# Patient Record
Sex: MALE | Race: ASIAN | NOT HISPANIC OR LATINO | URBAN - METROPOLITAN AREA
[De-identification: names, ages, dates, MRNs, and addresses within clinical notes are randomized per-mention and may not be internally consistent; named-entity substitution may affect disease eponyms.]

---

## 2018-07-07 ENCOUNTER — EMERGENCY (EMERGENCY)
Facility: HOSPITAL | Age: 83
LOS: 1 days | Discharge: HOME | End: 2018-07-07
Attending: EMERGENCY MEDICINE
Payer: MEDICARE

## 2018-07-07 VITALS
OXYGEN SATURATION: 95 % | HEART RATE: 64 BPM | DIASTOLIC BLOOD PRESSURE: 74 MMHG | RESPIRATION RATE: 18 BRPM | TEMPERATURE: 98 F | SYSTOLIC BLOOD PRESSURE: 128 MMHG

## 2018-07-07 VITALS
OXYGEN SATURATION: 95 % | HEIGHT: 64 IN | RESPIRATION RATE: 18 BRPM | DIASTOLIC BLOOD PRESSURE: 74 MMHG | SYSTOLIC BLOOD PRESSURE: 121 MMHG | HEART RATE: 66 BPM | WEIGHT: 139.99 LBS | TEMPERATURE: 98 F

## 2018-07-07 LAB
ALBUMIN SERPL ELPH-MCNC: 4.3 G/DL — SIGNIFICANT CHANGE UP (ref 3.3–5)
ALP SERPL-CCNC: 95 U/L — SIGNIFICANT CHANGE UP (ref 40–120)
ALT FLD-CCNC: 23 U/L — SIGNIFICANT CHANGE UP (ref 10–45)
ANION GAP SERPL CALC-SCNC: 13 MMOL/L — SIGNIFICANT CHANGE UP (ref 5–17)
APPEARANCE UR: CLEAR — SIGNIFICANT CHANGE UP
APTT BLD: 35.6 SEC — SIGNIFICANT CHANGE UP (ref 27.5–37.4)
AST SERPL-CCNC: 22 U/L — SIGNIFICANT CHANGE UP (ref 10–40)
BASOPHILS # BLD AUTO: 0 K/UL — SIGNIFICANT CHANGE UP (ref 0–0.2)
BASOPHILS NFR BLD AUTO: 0.4 % — SIGNIFICANT CHANGE UP (ref 0–2)
BILIRUB SERPL-MCNC: 0.4 MG/DL — SIGNIFICANT CHANGE UP (ref 0.2–1.2)
BILIRUB UR-MCNC: NEGATIVE — SIGNIFICANT CHANGE UP
BUN SERPL-MCNC: 16 MG/DL — SIGNIFICANT CHANGE UP (ref 7–23)
CALCIUM SERPL-MCNC: 9.5 MG/DL — SIGNIFICANT CHANGE UP (ref 8.4–10.5)
CHLORIDE SERPL-SCNC: 102 MMOL/L — SIGNIFICANT CHANGE UP (ref 96–108)
CO2 SERPL-SCNC: 25 MMOL/L — SIGNIFICANT CHANGE UP (ref 22–31)
COLOR SPEC: YELLOW — SIGNIFICANT CHANGE UP
CREAT SERPL-MCNC: 0.84 MG/DL — SIGNIFICANT CHANGE UP (ref 0.5–1.3)
DIFF PNL FLD: ABNORMAL
EOSINOPHIL # BLD AUTO: 0.1 K/UL — SIGNIFICANT CHANGE UP (ref 0–0.5)
EOSINOPHIL NFR BLD AUTO: 1.6 % — SIGNIFICANT CHANGE UP (ref 0–6)
GLUCOSE SERPL-MCNC: 103 MG/DL — HIGH (ref 70–99)
GLUCOSE UR QL: NEGATIVE — SIGNIFICANT CHANGE UP
HCT VFR BLD CALC: 47.8 % — SIGNIFICANT CHANGE UP (ref 39–50)
HGB BLD-MCNC: 16.6 G/DL — SIGNIFICANT CHANGE UP (ref 13–17)
INR BLD: 1.03 RATIO — SIGNIFICANT CHANGE UP (ref 0.88–1.16)
KETONES UR-MCNC: NEGATIVE — SIGNIFICANT CHANGE UP
LEUKOCYTE ESTERASE UR-ACNC: ABNORMAL
LYMPHOCYTES # BLD AUTO: 0.9 K/UL — LOW (ref 1–3.3)
LYMPHOCYTES # BLD AUTO: 23.1 % — SIGNIFICANT CHANGE UP (ref 13–44)
MCHC RBC-ENTMCNC: 33.3 PG — SIGNIFICANT CHANGE UP (ref 27–34)
MCHC RBC-ENTMCNC: 34.8 GM/DL — SIGNIFICANT CHANGE UP (ref 32–36)
MCV RBC AUTO: 95.5 FL — SIGNIFICANT CHANGE UP (ref 80–100)
MONOCYTES # BLD AUTO: 0.3 K/UL — SIGNIFICANT CHANGE UP (ref 0–0.9)
MONOCYTES NFR BLD AUTO: 7.4 % — SIGNIFICANT CHANGE UP (ref 2–14)
NEUTROPHILS # BLD AUTO: 2.8 K/UL — SIGNIFICANT CHANGE UP (ref 1.8–7.4)
NEUTROPHILS NFR BLD AUTO: 67.5 % — SIGNIFICANT CHANGE UP (ref 43–77)
NITRITE UR-MCNC: NEGATIVE — SIGNIFICANT CHANGE UP
PH UR: 6.5 — SIGNIFICANT CHANGE UP (ref 5–8)
PLATELET # BLD AUTO: 134 K/UL — LOW (ref 150–400)
POTASSIUM SERPL-MCNC: 4 MMOL/L — SIGNIFICANT CHANGE UP (ref 3.5–5.3)
POTASSIUM SERPL-SCNC: 4 MMOL/L — SIGNIFICANT CHANGE UP (ref 3.5–5.3)
PROT SERPL-MCNC: 7.6 G/DL — SIGNIFICANT CHANGE UP (ref 6–8.3)
PROT UR-MCNC: SIGNIFICANT CHANGE UP
PROTHROM AB SERPL-ACNC: 11.1 SEC — SIGNIFICANT CHANGE UP (ref 9.8–12.7)
RBC # BLD: 5 M/UL — SIGNIFICANT CHANGE UP (ref 4.2–5.8)
RBC # FLD: 11.9 % — SIGNIFICANT CHANGE UP (ref 10.3–14.5)
RBC CASTS # UR COMP ASSIST: >50 /HPF (ref 0–2)
SODIUM SERPL-SCNC: 140 MMOL/L — SIGNIFICANT CHANGE UP (ref 135–145)
SP GR SPEC: 1.01 — SIGNIFICANT CHANGE UP (ref 1.01–1.02)
UROBILINOGEN FLD QL: NEGATIVE — SIGNIFICANT CHANGE UP
WBC # BLD: 4.1 K/UL — SIGNIFICANT CHANGE UP (ref 3.8–10.5)
WBC # FLD AUTO: 4.1 K/UL — SIGNIFICANT CHANGE UP (ref 3.8–10.5)
WBC UR QL: SIGNIFICANT CHANGE UP /HPF (ref 0–5)

## 2018-07-07 PROCEDURE — 85027 COMPLETE CBC AUTOMATED: CPT

## 2018-07-07 PROCEDURE — 81001 URINALYSIS AUTO W/SCOPE: CPT

## 2018-07-07 PROCEDURE — 80053 COMPREHEN METABOLIC PANEL: CPT

## 2018-07-07 PROCEDURE — 87086 URINE CULTURE/COLONY COUNT: CPT

## 2018-07-07 PROCEDURE — 85610 PROTHROMBIN TIME: CPT

## 2018-07-07 PROCEDURE — 99284 EMERGENCY DEPT VISIT MOD MDM: CPT

## 2018-07-07 PROCEDURE — 85730 THROMBOPLASTIN TIME PARTIAL: CPT

## 2018-07-07 PROCEDURE — 99283 EMERGENCY DEPT VISIT LOW MDM: CPT

## 2018-07-07 RX ORDER — CEPHALEXIN 500 MG
500 CAPSULE ORAL ONCE
Qty: 0 | Refills: 0 | Status: COMPLETED | OUTPATIENT
Start: 2018-07-07 | End: 2018-07-07

## 2018-07-07 RX ORDER — CEPHALEXIN 500 MG
1 CAPSULE ORAL
Qty: 14 | Refills: 0
Start: 2018-07-07 | End: 2018-07-13

## 2018-07-07 RX ORDER — SODIUM CHLORIDE 9 MG/ML
500 INJECTION INTRAMUSCULAR; INTRAVENOUS; SUBCUTANEOUS ONCE
Qty: 0 | Refills: 0 | Status: COMPLETED | OUTPATIENT
Start: 2018-07-07 | End: 2018-07-07

## 2018-07-07 RX ADMIN — Medication 500 MILLIGRAM(S): at 18:47

## 2018-07-07 RX ADMIN — SODIUM CHLORIDE 500 MILLILITER(S): 9 INJECTION INTRAMUSCULAR; INTRAVENOUS; SUBCUTANEOUS at 17:01

## 2018-07-07 NOTE — ED PROVIDER NOTE - PLAN OF CARE
1.  Stay well hydrated  2.  Take Keflex 500mgs every 12 hrs x 7 days  3.  Follow up with your PMD in 2-3 days.  Bring a copy of your results with you to your appointment       Follow up with Urology upon discharge [phone]  4.  Return to the ER for worsening bleeding, fevers/chills or any other concerning symptoms

## 2018-07-07 NOTE — ED PROVIDER NOTE - CARE PLAN
Principal Discharge DX:	Hematuria  Assessment and plan of treatment:	1.  Stay well hydrated  2.  Take Keflex 500mgs every 12 hrs x 7 days  3.  Follow up with your PMD in 2-3 days.  Bring a copy of your results with you to your appointment       Follow up with Urology upon discharge [phone]  4.  Return to the ER for worsening bleeding, fevers/chills or any other concerning symptoms Principal Discharge DX:	Cystitis  Assessment and plan of treatment:	1.  Stay well hydrated  2.  Take Keflex 500mgs every 12 hrs x 7 days  3.  Follow up with your PMD in 2-3 days.  Bring a copy of your results with you to your appointment       Follow up with Urology upon discharge [phone]  4.  Return to the ER for worsening bleeding, fevers/chills or any other concerning symptoms

## 2018-07-07 NOTE — ED ADULT NURSE NOTE - OBJECTIVE STATEMENT
83 y/o male c/o hematuria that started about 4 hours ago.  Pt denies trauma, dysuria, fever, chills, abd pain, back pain, n/v/d.  Pt denies history of UTI.  No acute distress noted.  Extremities mobile. 85 y/o male c/o hematuria that started about 2 pm.  Pt denies trauma, dysuria, fever, chills, abd pain, back pain, n/v/d.  Pt denies history of UTI.  No acute distress noted.  Extremities mobile. 83 y/o male c/o  hematuria with frequent urination since this afternoon.  Denies difficulty urination or urgency.     Denies weakness to extremities, trauma, fever, chills, chest pain, abd pain, n/v/d.   No acute distress noted.  Extremities mobile.

## 2018-07-07 NOTE — ED PROVIDER NOTE - OBJECTIVE STATEMENT
83yo male pt with PMHx of HTN, BPH, Thyroidism c/o hematuria 83yo male pt with PMHx of HTN, BPH, Thyroidism c/o hematuria with frequent urination since this afternoon. Denies fever, chills or recent sickness. Denies difficult urination or urgency. Denies CP/SOB/ABD pain or N/V/D. Denies sensory changes or weakness to extremities. Denies sensory changes or weakness to extremities. Denies taking AC.

## 2018-07-07 NOTE — ED PROVIDER NOTE - PROGRESS NOTE DETAILS
Attending Rodrigue GALEANO): 83y/o M with PMHx of HTN (on Avapro), hypothyroidism (on Synthroid), myasthenia gravis, BPH, presents to the ED c/o multiple episodes of hematuria beginning this morning. Pt last had hematuria 4y ago when he had a BPH. Denies fever, nausea/vomiting, blood in stool, burning urination, abdominal pain, back pain, recent weight loss, any other complaints. NKDA.     Exam: Abdomen soft, minimal suprapubic tenderness. Otherwise well appearing in NAD, non-toxic appearing. No CVA tenderness, neurologically intact.     Plan: Will place on abx, f/u with urology outpatient. Attending Note: 85y/o M with PMHx of HTN (on Avapro), hypothyroidism (on Synthroid), myasthenia gravis, BPH, presents to the ED c/o multiple episodes of hematuria beginning this morning. Pt last had hematuria 4y ago when he had a BPH. Denies fever, nausea/vomiting, blood in stool, burning urination, abdominal pain, back pain, recent weight loss, any other complaints. NKDA.     Exam: Abdomen soft, minimal suprapubic tenderness. Otherwise well appearing in NAD, non-toxic appearing. No CVA tenderness, neurologically intact.     Plan: Will place on abx, f/u with urology outpatient.

## 2018-07-08 LAB
CULTURE RESULTS: NO GROWTH — SIGNIFICANT CHANGE UP
SPECIMEN SOURCE: SIGNIFICANT CHANGE UP

## 2021-07-10 ENCOUNTER — EMERGENCY (EMERGENCY)
Facility: HOSPITAL | Age: 86
LOS: 1 days | End: 2021-07-10
Payer: MEDICARE

## 2021-07-10 VITALS
WEIGHT: 119.93 LBS | HEART RATE: 69 BPM | RESPIRATION RATE: 18 BRPM | SYSTOLIC BLOOD PRESSURE: 131 MMHG | TEMPERATURE: 98 F | HEIGHT: 64 IN | DIASTOLIC BLOOD PRESSURE: 79 MMHG | OXYGEN SATURATION: 95 %

## 2021-07-10 LAB
ALBUMIN SERPL ELPH-MCNC: 4.3 G/DL — SIGNIFICANT CHANGE UP (ref 3.3–5)
ALP SERPL-CCNC: 77 U/L — SIGNIFICANT CHANGE UP (ref 40–120)
ALT FLD-CCNC: 32 U/L — SIGNIFICANT CHANGE UP (ref 10–45)
ANION GAP SERPL CALC-SCNC: 11 MMOL/L — SIGNIFICANT CHANGE UP (ref 5–17)
AST SERPL-CCNC: 23 U/L — SIGNIFICANT CHANGE UP (ref 10–40)
BASOPHILS # BLD AUTO: 0.05 K/UL — SIGNIFICANT CHANGE UP (ref 0–0.2)
BASOPHILS NFR BLD AUTO: 0.8 % — SIGNIFICANT CHANGE UP (ref 0–2)
BILIRUB SERPL-MCNC: 0.5 MG/DL — SIGNIFICANT CHANGE UP (ref 0.2–1.2)
BUN SERPL-MCNC: 31 MG/DL — HIGH (ref 7–23)
CALCIUM SERPL-MCNC: 8.9 MG/DL — SIGNIFICANT CHANGE UP (ref 8.4–10.5)
CHLORIDE SERPL-SCNC: 110 MMOL/L — HIGH (ref 96–108)
CO2 SERPL-SCNC: 24 MMOL/L — SIGNIFICANT CHANGE UP (ref 22–31)
CREAT SERPL-MCNC: 0.67 MG/DL — SIGNIFICANT CHANGE UP (ref 0.5–1.3)
EOSINOPHIL # BLD AUTO: 0.05 K/UL — SIGNIFICANT CHANGE UP (ref 0–0.5)
EOSINOPHIL NFR BLD AUTO: 0.8 % — SIGNIFICANT CHANGE UP (ref 0–6)
GLUCOSE SERPL-MCNC: 104 MG/DL — HIGH (ref 70–99)
HCT VFR BLD CALC: 42.8 % — SIGNIFICANT CHANGE UP (ref 39–50)
HGB BLD-MCNC: 14.2 G/DL — SIGNIFICANT CHANGE UP (ref 13–17)
IMM GRANULOCYTES NFR BLD AUTO: 0.5 % — SIGNIFICANT CHANGE UP (ref 0–1.5)
LYMPHOCYTES # BLD AUTO: 0.55 K/UL — LOW (ref 1–3.3)
LYMPHOCYTES # BLD AUTO: 8.4 % — LOW (ref 13–44)
MCHC RBC-ENTMCNC: 31 PG — SIGNIFICANT CHANGE UP (ref 27–34)
MCHC RBC-ENTMCNC: 33.2 GM/DL — SIGNIFICANT CHANGE UP (ref 32–36)
MCV RBC AUTO: 93.4 FL — SIGNIFICANT CHANGE UP (ref 80–100)
MONOCYTES # BLD AUTO: 0.43 K/UL — SIGNIFICANT CHANGE UP (ref 0–0.9)
MONOCYTES NFR BLD AUTO: 6.6 % — SIGNIFICANT CHANGE UP (ref 2–14)
NEUTROPHILS # BLD AUTO: 5.4 K/UL — SIGNIFICANT CHANGE UP (ref 1.8–7.4)
NEUTROPHILS NFR BLD AUTO: 82.9 % — HIGH (ref 43–77)
NRBC # BLD: 0 /100 WBCS — SIGNIFICANT CHANGE UP (ref 0–0)
PLATELET # BLD AUTO: 146 K/UL — LOW (ref 150–400)
POTASSIUM SERPL-MCNC: 3.8 MMOL/L — SIGNIFICANT CHANGE UP (ref 3.5–5.3)
POTASSIUM SERPL-SCNC: 3.8 MMOL/L — SIGNIFICANT CHANGE UP (ref 3.5–5.3)
PROT SERPL-MCNC: 6.9 G/DL — SIGNIFICANT CHANGE UP (ref 6–8.3)
RBC # BLD: 4.58 M/UL — SIGNIFICANT CHANGE UP (ref 4.2–5.8)
RBC # FLD: 12.4 % — SIGNIFICANT CHANGE UP (ref 10.3–14.5)
SARS-COV-2 RNA SPEC QL NAA+PROBE: SIGNIFICANT CHANGE UP
SODIUM SERPL-SCNC: 145 MMOL/L — SIGNIFICANT CHANGE UP (ref 135–145)
WBC # BLD: 6.51 K/UL — SIGNIFICANT CHANGE UP (ref 3.8–10.5)
WBC # FLD AUTO: 6.51 K/UL — SIGNIFICANT CHANGE UP (ref 3.8–10.5)

## 2021-07-10 PROCEDURE — 80053 COMPREHEN METABOLIC PANEL: CPT

## 2021-07-10 PROCEDURE — 71250 CT THORAX DX C-: CPT | Mod: 26,MG

## 2021-07-10 PROCEDURE — 76377 3D RENDER W/INTRP POSTPROCES: CPT | Mod: 26

## 2021-07-10 PROCEDURE — 70486 CT MAXILLOFACIAL W/O DYE: CPT | Mod: 26,MA

## 2021-07-10 PROCEDURE — 72125 CT NECK SPINE W/O DYE: CPT | Mod: 26,MA

## 2021-07-10 PROCEDURE — 93010 ELECTROCARDIOGRAM REPORT: CPT

## 2021-07-10 PROCEDURE — 70450 CT HEAD/BRAIN W/O DYE: CPT | Mod: 26,MA

## 2021-07-10 PROCEDURE — 73030 X-RAY EXAM OF SHOULDER: CPT | Mod: 26,LT

## 2021-07-10 PROCEDURE — 70450 CT HEAD/BRAIN W/O DYE: CPT

## 2021-07-10 PROCEDURE — G1004: CPT

## 2021-07-10 PROCEDURE — 87635 SARS-COV-2 COVID-19 AMP PRB: CPT

## 2021-07-10 PROCEDURE — 90714 TD VACC NO PRESV 7 YRS+ IM: CPT

## 2021-07-10 PROCEDURE — 71250 CT THORAX DX C-: CPT

## 2021-07-10 PROCEDURE — 99284 EMERGENCY DEPT VISIT MOD MDM: CPT | Mod: 25

## 2021-07-10 PROCEDURE — 93005 ELECTROCARDIOGRAM TRACING: CPT

## 2021-07-10 PROCEDURE — 85025 COMPLETE CBC W/AUTO DIFF WBC: CPT

## 2021-07-10 PROCEDURE — 76377 3D RENDER W/INTRP POSTPROCES: CPT

## 2021-07-10 PROCEDURE — 90471 IMMUNIZATION ADMIN: CPT

## 2021-07-10 PROCEDURE — 73060 X-RAY EXAM OF HUMERUS: CPT | Mod: 26,LT

## 2021-07-10 PROCEDURE — 70486 CT MAXILLOFACIAL W/O DYE: CPT

## 2021-07-10 PROCEDURE — 73030 X-RAY EXAM OF SHOULDER: CPT

## 2021-07-10 PROCEDURE — 99285 EMERGENCY DEPT VISIT HI MDM: CPT

## 2021-07-10 PROCEDURE — 73060 X-RAY EXAM OF HUMERUS: CPT

## 2021-07-10 PROCEDURE — 72125 CT NECK SPINE W/O DYE: CPT

## 2021-07-10 RX ORDER — TETANUS AND DIPHTHERIA TOXOIDS ADSORBED 2; 2 [LF]/.5ML; [LF]/.5ML
0.5 INJECTION INTRAMUSCULAR ONCE
Refills: 0 | Status: COMPLETED | OUTPATIENT
Start: 2021-07-10 | End: 2021-07-10

## 2021-07-10 RX ORDER — BACITRACIN ZINC 500 UNIT/G
1 OINTMENT IN PACKET (EA) TOPICAL ONCE
Refills: 0 | Status: COMPLETED | OUTPATIENT
Start: 2021-07-10 | End: 2021-07-10

## 2021-07-10 RX ADMIN — TETANUS AND DIPHTHERIA TOXOIDS ADSORBED 0.5 MILLILITER(S): 2; 2 INJECTION INTRAMUSCULAR at 09:19

## 2021-07-10 RX ADMIN — Medication 1 APPLICATION(S): at 11:27

## 2021-07-10 NOTE — ED PROVIDER NOTE - NS ED ROS FT
REVIEW OF SYSTEMS:  CONSTITUTIONAL: No weakness, fevers, chills, sick contacts, or unintended weight loss  EYES: No visual changes or vertigo  ENT: No throat pain, rhinorrhea, or hearing loss   NECK: No pain or stiffness  RESPIRATORY: No cough, wheezing, hemoptysis; No shortness of breath  CARDIOVASCULAR: No chest pain or palpitations  GASTROINTESTINAL: No abdominal or epigastric pain. No nausea, vomiting, or hematemesis; No diarrhea or constipation. No melena or hematochezia.  GENITOURINARY: No dysuria, frequency or hematuria  NEUROLOGICAL: No numbness or weakness. Left arm pain.   SKIN: + facial bruising. + b/l knee abrasions  Psych: Good mood, no substance use

## 2021-07-10 NOTE — ED PROVIDER NOTE - NSFOLLOWUPINSTRUCTIONS_ED_ALL_ED_FT
You were seen in the Emergency Department for a fall. Your left shoulder hurts but there were no signs of fractures or dislocations. You had a scan of the head which just showed some swelling in the left eye area but no bleeding. Take tylenol and ibuprofen, keep your arm in a sling, and work with PT.     1) Advance activity as tolerated.   2) Continue all previously prescribed medications as directed.    3) Follow up with your primary care physician in 24-48 hours - take copies of your results.    4) Return to the Emergency Department for worsening or persistent symptoms, and/or ANY NEW OR CONCERNING SYMPTOMS.

## 2021-07-10 NOTE — ED ADULT TRIAGE NOTE - CHIEF COMPLAINT QUOTE
left shoulder pain, bilateral knee pain, facial injury s/p trip and fall on concrete yesterday.  denies LOC or a.c. use

## 2021-07-10 NOTE — ED PROVIDER NOTE - CLINICAL SUMMARY MEDICAL DECISION MAKING FREE TEXT BOX
Impression:  Elderly M, s/p mechanical fall.  Physical exam concerning for L shoulder Fx v dislocation.  Plan:  labs, UA, CXR, ECG, CT head/cspine (cannot r/o injury by CCTHR/CCTCspR), xray L shoulder, declining pain meds. Impression:  Elderly M, s/p mechanical fall.  Physical exam concerning for L shoulder Fx v dislocation.  Plan:  labs, UA, CXR, ECG, CT head/cspine (cannot r/o injury by CCTHR/CCTCspR), xray L shoulder, declining pain meds.  Conversion with daughter: Marilyn Chacko [phone] states that if no surgical intervention indicated, patient has a lot of family support at home (would live with daughter or other daughter will live with him) and has PT coming to see him already and would be comfortable being discharged to home.

## 2021-07-10 NOTE — ED ADULT NURSE NOTE - OBJECTIVE STATEMENT
complaining of left shoulder pain, bilateral knee pain, facial injury x last night at 8pm. Pt states, "My wife tripped and fell and I tried to catch her and then I tripped and fell. I fell on my left face and shoulder." Pt denies LOC and denies being on blood thinners. Pt is Danish speaking but understands and speaks english. Pt endorses left sided face and shoulder pain at present. Pt denies headache, blurred vision, lightheadedness, dizziness, SOB, chest pain, abdominal pain, N/V/D, urinary symptoms, numbness and tingling at present.
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
## 2021-07-10 NOTE — ED ADULT NURSE NOTE - NSIMPLEMENTINTERV_GEN_ALL_ED
Implemented All Fall with Harm Risk Interventions:  [place] to call system. Call bell, personal items and telephone within reach. Instruct patient to call for assistance. Room bathroom lighting operational. Non-slip footwear when patient is off stretcher. Physically safe environment: no spills, clutter or unnecessary equipment. Stretcher in lowest position, wheels locked, appropriate side rails in place. Provide visual cue, wrist band, yellow gown, etc. Monitor gait and stability. Monitor for mental status changes and reorient to person, place, and time. Review medications for side effects contributing to fall risk. Reinforce activity limits and safety measures with patient and family. Provide visual clues: red socks.

## 2021-07-10 NOTE — ED PROVIDER NOTE - PHYSICAL EXAMINATION
Objective:    Vitals: Vital Signs Last 24 Hrs  T(C): 36.5 (07-10-21 @ 07:13), Max: 36.5 (07-10-21 @ 07:13)  T(F): 97.7 (07-10-21 @ 07:13), Max: 97.7 (07-10-21 @ 07:13)  HR: 69 (07-10-21 @ 07:13) (69 - 69)  BP: 131/79 (07-10-21 @ 07:13) (131/79 - 131/79)  BP(mean): --  RR: 18 (07-10-21 @ 07:13) (18 - 18)  SpO2: 95% (07-10-21 @ 07:13) (95% - 95%)            I&O's Summary      PHYSICAL EXAM:  GENERAL: NAD, well-groomed, well-developed, in makeshift arm sling  HEAD: Left periorbital bruising.   EYES: EOMI, PERRLA, conjunctiva and sclera clear  ENMT: No tonsillar erythema, exudates, or enlargement; Moist mucous membranes, Good dentition, No lesions  CHEST/LUNG: Clear to auscultation bilaterally; No rales, rhonchi, wheezing, or rubs  HEART: Regular rate and rhythm; No murmurs, rubs, or gallops  ABDOMEN: Soft, Nontender, Nondistended; Bowel sounds present  EXTREMITIES:  2+ Peripheral Pulses. b/l knee mild 3cm abrasions.   NERVOUS SYSTEM:  Alert & Oriented X4, Good concentration  PSYCH: Normal Affect. Speaking in Full Sentences. Laying in bed comfortably; not agitated
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
## 2021-07-10 NOTE — ED PROVIDER NOTE - CARE PROVIDER_API CALL
Fco Lagos [facility]  [address] [place] [address]  Phone: [phone]  Fax: [phone]  Follow Up Time:

## 2021-07-10 NOTE — ED ADULT NURSE NOTE - DATE OF LAST VACCINATION
Ongoing SW/CM Assessment/Plan of Care Note     See SW/CM flowsheets for goals and other objective data.    Patient/Family discharge goal (s):  Goal #1: Communication facilitated          PT Recommendation:  Recommendation for Discharge: PT WI: Post acute therapy       OT Recommendation:  Recommendations for Discharge: OT WI: Post acute therapy       SLP Recommendation:       Disposition:       Progress note:    met with patient in regards to discharge planning. Patient aware at this time physiatry will follow however likely if patient does not make further progress intensive rehab program will not be recommended and [facility] would be more appropriate. Patient reports he is hopeful for pain relief with a discharge to home when stable however open to [facility] referrals. Referrals sent to [facility], [facility], [facility] and [facility]. Patient reports hopeful therapy team at [facility] will have knowledge on sciatic pain.  explained [facility] will review clinical and make determination if they can accommodate patient needs. Support offered. Social work peer to follow in am for updates pending referrals.          24-Feb-2021

## 2021-07-10 NOTE — ED PROVIDER NOTE - CARE PLAN
Principal Discharge DX:	Fall   Principal Discharge DX:	Contusion of left shoulder, initial encounter  Secondary Diagnosis:	Facial contusion, initial encounter  Secondary Diagnosis:	Fall, initial encounter

## 2021-07-19 PROBLEM — N40.0 BENIGN PROSTATIC HYPERPLASIA WITHOUT LOWER URINARY TRACT SYMPTOMS: Chronic | Status: ACTIVE | Noted: 2018-07-07

## 2021-07-19 PROBLEM — E03.9 HYPOTHYROIDISM, UNSPECIFIED: Chronic | Status: ACTIVE | Noted: 2018-07-07

## 2021-09-20 ENCOUNTER — APPOINTMENT (OUTPATIENT)
Dept: GERIATRICS | Facility: CLINIC | Age: 86
End: 2021-09-20
Payer: MEDICARE

## 2021-09-20 ENCOUNTER — NON-APPOINTMENT (OUTPATIENT)
Age: 86
End: 2021-09-20

## 2021-09-20 VITALS
WEIGHT: 120.13 LBS | BODY MASS INDEX: 20.51 KG/M2 | SYSTOLIC BLOOD PRESSURE: 100 MMHG | DIASTOLIC BLOOD PRESSURE: 54 MMHG | RESPIRATION RATE: 16 BRPM | OXYGEN SATURATION: 97 % | HEIGHT: 64 IN | TEMPERATURE: 97.8 F | HEART RATE: 62 BPM

## 2021-09-20 DIAGNOSIS — M79.605 PAIN IN RIGHT LEG: ICD-10-CM

## 2021-09-20 DIAGNOSIS — R60.9 EDEMA, UNSPECIFIED: ICD-10-CM

## 2021-09-20 DIAGNOSIS — M79.604 PAIN IN RIGHT LEG: ICD-10-CM

## 2021-09-20 PROCEDURE — 99215 OFFICE O/P EST HI 40 MIN: CPT

## 2021-09-29 PROBLEM — R60.9 EDEMA: Status: ACTIVE | Noted: 2021-09-20

## 2021-09-29 PROBLEM — M79.604 PAIN IN BOTH LOWER EXTREMITIES: Status: ACTIVE | Noted: 2021-09-29

## 2021-10-25 ENCOUNTER — TRANSCRIPTION ENCOUNTER (OUTPATIENT)
Age: 86
End: 2021-10-25

## 2021-10-26 ENCOUNTER — OUTPATIENT (OUTPATIENT)
Dept: OUTPATIENT SERVICES | Facility: HOSPITAL | Age: 86
LOS: 1 days | End: 2021-10-26
Payer: MEDICARE

## 2021-10-26 ENCOUNTER — APPOINTMENT (OUTPATIENT)
Dept: ULTRASOUND IMAGING | Facility: CLINIC | Age: 86
End: 2021-10-26
Payer: MEDICARE

## 2021-10-26 ENCOUNTER — APPOINTMENT (OUTPATIENT)
Dept: RADIOLOGY | Facility: CLINIC | Age: 86
End: 2021-10-26
Payer: MEDICARE

## 2021-10-26 DIAGNOSIS — R60.9 EDEMA, UNSPECIFIED: ICD-10-CM

## 2021-10-26 PROCEDURE — 73030 X-RAY EXAM OF SHOULDER: CPT | Mod: 26,LT

## 2021-10-26 PROCEDURE — 93925 LOWER EXTREMITY STUDY: CPT | Mod: 26

## 2021-10-26 PROCEDURE — 93925 LOWER EXTREMITY STUDY: CPT

## 2021-10-26 PROCEDURE — 73030 X-RAY EXAM OF SHOULDER: CPT

## 2021-11-01 ENCOUNTER — APPOINTMENT (OUTPATIENT)
Dept: SURGERY | Facility: CLINIC | Age: 86
End: 2021-11-01
Payer: MEDICARE

## 2021-11-01 VITALS — DIASTOLIC BLOOD PRESSURE: 75 MMHG | TEMPERATURE: 98.25 F | SYSTOLIC BLOOD PRESSURE: 119 MMHG | HEART RATE: 65 BPM

## 2021-11-01 PROCEDURE — 99203 OFFICE O/P NEW LOW 30 MIN: CPT

## 2021-11-23 ENCOUNTER — TRANSCRIPTION ENCOUNTER (OUTPATIENT)
Age: 86
End: 2021-11-23

## 2021-11-29 ENCOUNTER — NON-APPOINTMENT (OUTPATIENT)
Age: 86
End: 2021-11-29

## 2021-11-29 ENCOUNTER — APPOINTMENT (OUTPATIENT)
Dept: GERIATRICS | Facility: CLINIC | Age: 86
End: 2021-11-29
Payer: MEDICARE

## 2021-11-29 VITALS
DIASTOLIC BLOOD PRESSURE: 62 MMHG | HEIGHT: 64 IN | WEIGHT: 121.25 LBS | SYSTOLIC BLOOD PRESSURE: 100 MMHG | HEART RATE: 70 BPM | RESPIRATION RATE: 16 BRPM | BODY MASS INDEX: 20.7 KG/M2 | OXYGEN SATURATION: 95 % | TEMPERATURE: 97.4 F

## 2021-11-29 DIAGNOSIS — M25.512 PAIN IN LEFT SHOULDER: ICD-10-CM

## 2021-11-29 DIAGNOSIS — Z01.818 ENCOUNTER FOR OTHER PREPROCEDURAL EXAMINATION: ICD-10-CM

## 2021-11-29 DIAGNOSIS — E55.9 VITAMIN D DEFICIENCY, UNSPECIFIED: ICD-10-CM

## 2021-11-29 DIAGNOSIS — K40.90 UNILATERAL INGUINAL HERNIA, W/OUT OBSTRUCTION OR GANGRENE, NOT SPECIFIED AS RECURRENT: ICD-10-CM

## 2021-11-29 DIAGNOSIS — R73.09 OTHER ABNORMAL GLUCOSE: ICD-10-CM

## 2021-11-29 PROCEDURE — 99214 OFFICE O/P EST MOD 30 MIN: CPT | Mod: 25

## 2021-11-29 PROCEDURE — 93000 ELECTROCARDIOGRAM COMPLETE: CPT

## 2021-11-29 RX ORDER — CARBIDOPA AND LEVODOPA 25; 100 MG/1; MG/1
25-100 TABLET ORAL
Qty: 4 | Refills: 0 | Status: DISCONTINUED | COMMUNITY
Start: 2021-09-30

## 2021-11-30 LAB
25(OH)D3 SERPL-MCNC: 52.1 NG/ML
ALBUMIN SERPL ELPH-MCNC: 4.3 G/DL
ALP BLD-CCNC: 88 U/L
ALT SERPL-CCNC: 7 U/L
ANION GAP SERPL CALC-SCNC: 10 MMOL/L
APTT BLD: 35.7 SEC
AST SERPL-CCNC: 21 U/L
BASOPHILS # BLD AUTO: 0.04 K/UL
BASOPHILS NFR BLD AUTO: 0.9 %
BILIRUB SERPL-MCNC: 0.5 MG/DL
BUN SERPL-MCNC: 25 MG/DL
CALCIUM SERPL-MCNC: 8.9 MG/DL
CHLORIDE SERPL-SCNC: 107 MMOL/L
CO2 SERPL-SCNC: 23 MMOL/L
CREAT SERPL-MCNC: 0.68 MG/DL
EOSINOPHIL # BLD AUTO: 0.01 K/UL
EOSINOPHIL NFR BLD AUTO: 0.2 %
ESTIMATED AVERAGE GLUCOSE: 108 MG/DL
FERRITIN SERPL-MCNC: 348 NG/ML
GLUCOSE SERPL-MCNC: 91 MG/DL
HBA1C MFR BLD HPLC: 5.4 %
HCT VFR BLD CALC: 43.1 %
HGB BLD-MCNC: 14.2 G/DL
IMM GRANULOCYTES NFR BLD AUTO: 0.2 %
INR PPP: 1.02 RATIO
IRON SATN MFR SERPL: 41 %
IRON SERPL-MCNC: 106 UG/DL
LYMPHOCYTES # BLD AUTO: 0.55 K/UL
LYMPHOCYTES NFR BLD AUTO: 12.9 %
MAN DIFF?: NORMAL
MCHC RBC-ENTMCNC: 30.9 PG
MCHC RBC-ENTMCNC: 32.9 GM/DL
MCV RBC AUTO: 93.7 FL
MONOCYTES # BLD AUTO: 0.33 K/UL
MONOCYTES NFR BLD AUTO: 7.7 %
NEUTROPHILS # BLD AUTO: 3.32 K/UL
NEUTROPHILS NFR BLD AUTO: 78.1 %
PLATELET # BLD AUTO: 160 K/UL
POTASSIUM SERPL-SCNC: 4.2 MMOL/L
PROT SERPL-MCNC: 6.6 G/DL
PT BLD: 12.1 SEC
RBC # BLD: 4.6 M/UL
RBC # FLD: 12.6 %
SODIUM SERPL-SCNC: 140 MMOL/L
TIBC SERPL-MCNC: 261 UG/DL
TSH SERPL-ACNC: 1.69 UIU/ML
UIBC SERPL-MCNC: 156 UG/DL
WBC # FLD AUTO: 4.26 K/UL

## 2021-12-02 NOTE — ASSESSMENT
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 patient is medically optimized to proceed with planned procedure.  EKG today with NSR at rate of 63 without t wave abnormalities.\par labwork:  reviewed and stable

## 2021-12-02 NOTE — HISTORY OF PRESENT ILLNESS
[Preoperative Visit] : for a medical evaluation prior to surgery [Scheduled Procedure ___] : a [unfilled] [Date of Surgery ___] : on [unfilled] [Prior Anesthesia] : Prior anesthesia [Poor] : Poor [Fever] : no fever [Chills] : no chills [Fatigue] : no fatigue [Chest Pain] : no chest pain [Cough] : no cough [Dyspnea] : no dyspnea [Dysuria] : no dysuria [Urinary Frequency] : no urinary frequency [Nausea] : no nausea [Vomiting] : no vomiting [Diarrhea] : no diarrhea [Abdominal Pain] : no abdominal pain [Easy Bruising] : no easy bruising [Lower Extremity Swelling] : no lower extremity swelling [Diabetes] : no diabetes [Cardiovascular Disease] : no cardiovascular disease [Pulmonary Disease] : no pulmonary disease [Anti-Platelet Agents] : no anti-platelet agents [Nicotine Dependence] : no nicotine dependence [Alcohol Use] : no  alcohol use [Renal Disease] : no renal disease [GI Disease] : no gastrointestinal disease [Sleep Apnea] : no sleep apnea [Thromboembolic Problems] : no thromboembolic problems [Frequent use of NSAIDs] : no use of NSAIDs [Prev Anesthesia Reaction] : no previous anesthesia reaction [Anesthesia Reaction] : no anesthesia reaction [Sudden Death] : no sudden death [Clotting Disorder] : no clotting disorder [Bleeding Disorder] : no bleeding disorder [de-identified] : Dr. 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  \par \par follows with endo : Fco Houser MD as he pcp\par \par takes levo 50mcg\par has had recent bloodwork that was normal per patient\par cholesterol was stable\par \par bph: \par finasteride 5mg \par Dr. Segundo Bruno urologist\par hx of prostate ablation\par \par lives with wife \par he has help with cooking \par \par functional status:\par worse gait over last 1 year, worse over last 6 months\par home OT\par walks with cane\par May 2021: fall required ER visit for facial bruising, no fxr but with worse left arm ROM since the fall\par \par independent with ADL's\par needs help with IADL's\par \par neurology evaluation:\par Dr. George Collins at [facility] has had memory testing dtr reports moderate dementia\par atypical Parkinsons syndrome\par started sinemet 25/100 1/2 tab twice day in May 2021, and was changed to ER at same dose.\par dtr notes improved hypophonia\par overall unchanged gait/poor balance\par weight loss\par constipation: colace daily, recommend starting senna\par \par sleep disturbance:\par ropinirole and deep breaths\par sleep study in May 2021: negative for sleep apnea\par ? REM sleep disorder possible per dtr\par \par hx of prediabetes that is diet controlled\par \par notes neuropathy: numbness of b/l lets? related to RLS\par sometimes with low back pain when standing for prolonged time but it is minimal\par tried gabapentin for only for a few days.\par  \par he denies CV or kidney disease.\par \par left shoulder pain: worse from the fall , has been doing OT, but still with some pain, discussed referra to ortho\par \par ACP:\par they are planning to see  soon\par \par 1 children\par son: Segundo Chacko\par dtr: Citlali Chaney (md pulm crit)\par dtr: Marilyn Chacko \par \par

## 2021-12-02 NOTE — PHYSICAL EXAM
[Normal] : no respiratory distress, no accessory muscle use, normal respiratory rhythm and effort, lungs were clear to auscultation bilaterally [Normal S1, S2] : normal S1 and S2 [Heart Rate And Rhythm] : heart rate was normal and rhythm regular [Bowel Sounds] : normal bowel sounds [Abdomen Tenderness] : non-tender [Abdomen Soft] : soft [Normal Color / Pigmentation] : normal skin color and pigmentation [No Focal Deficits] : no focal deficits [Normal Affect] : the affect was normal [Normal Mood] : the mood was normal [de-identified] : mild edema [de-identified] : left inguinal hernia [de-identified] : slow gait [de-identified] : hypophonia

## 2021-12-03 ENCOUNTER — OUTPATIENT (OUTPATIENT)
Dept: OUTPATIENT SERVICES | Facility: HOSPITAL | Age: 86
LOS: 1 days | Discharge: HOME | End: 2021-12-03
Payer: MEDICARE

## 2021-12-03 VITALS
WEIGHT: 121.25 LBS | SYSTOLIC BLOOD PRESSURE: 106 MMHG | HEIGHT: 65 IN | OXYGEN SATURATION: 98 % | RESPIRATION RATE: 16 BRPM | TEMPERATURE: 98 F | DIASTOLIC BLOOD PRESSURE: 61 MMHG | HEART RATE: 60 BPM

## 2021-12-03 DIAGNOSIS — Z01.818 ENCOUNTER FOR OTHER PREPROCEDURAL EXAMINATION: ICD-10-CM

## 2021-12-03 DIAGNOSIS — N40.0 BENIGN PROSTATIC HYPERPLASIA WITHOUT LOWER URINARY TRACT SYMPTOMS: ICD-10-CM

## 2021-12-03 DIAGNOSIS — E03.9 HYPOTHYROIDISM, UNSPECIFIED: ICD-10-CM

## 2021-12-03 DIAGNOSIS — Z01.812 ENCOUNTER FOR PREPROCEDURAL LABORATORY EXAMINATION: ICD-10-CM

## 2021-12-03 DIAGNOSIS — Z90.79 ACQUIRED ABSENCE OF OTHER GENITAL ORGAN(S): Chronic | ICD-10-CM

## 2021-12-03 DIAGNOSIS — F03.90 UNSPECIFIED DEMENTIA, UNSPECIFIED SEVERITY, WITHOUT BEHAVIORAL DISTURBANCE, PSYCHOTIC DISTURBANCE, MOOD DISTURBANCE, AND ANXIETY: ICD-10-CM

## 2021-12-03 DIAGNOSIS — K40.90 UNILATERAL INGUINAL HERNIA, WITHOUT OBSTRUCTION OR GANGRENE, NOT SPECIFIED AS RECURRENT: ICD-10-CM

## 2021-12-03 DIAGNOSIS — G25.81 RESTLESS LEGS SYNDROME: ICD-10-CM

## 2021-12-03 DIAGNOSIS — G20 PARKINSON'S DISEASE: ICD-10-CM

## 2021-12-03 LAB
ANION GAP SERPL CALC-SCNC: 8 MMOL/L — SIGNIFICANT CHANGE UP (ref 5–17)
BUN SERPL-MCNC: 28 MG/DL — HIGH (ref 7–23)
CALCIUM SERPL-MCNC: 8.6 MG/DL — SIGNIFICANT CHANGE UP (ref 8.5–10.1)
CHLORIDE SERPL-SCNC: 107 MMOL/L — SIGNIFICANT CHANGE UP (ref 96–108)
CO2 SERPL-SCNC: 25 MMOL/L — SIGNIFICANT CHANGE UP (ref 22–31)
CREAT SERPL-MCNC: 0.58 MG/DL — SIGNIFICANT CHANGE UP (ref 0.5–1.3)
GLUCOSE SERPL-MCNC: 116 MG/DL — HIGH (ref 70–99)
HCT VFR BLD CALC: 42 % — SIGNIFICANT CHANGE UP (ref 39–50)
HGB BLD-MCNC: 14 G/DL — SIGNIFICANT CHANGE UP (ref 13–17)
MCHC RBC-ENTMCNC: 30.8 PG — SIGNIFICANT CHANGE UP (ref 27–34)
MCHC RBC-ENTMCNC: 33.3 GM/DL — SIGNIFICANT CHANGE UP (ref 32–36)
MCV RBC AUTO: 92.3 FL — SIGNIFICANT CHANGE UP (ref 80–100)
NRBC # BLD: 0 /100 WBCS — SIGNIFICANT CHANGE UP (ref 0–0)
PLATELET # BLD AUTO: 156 K/UL — SIGNIFICANT CHANGE UP (ref 150–400)
POTASSIUM SERPL-MCNC: 3.6 MMOL/L — SIGNIFICANT CHANGE UP (ref 3.5–5.3)
POTASSIUM SERPL-SCNC: 3.6 MMOL/L — SIGNIFICANT CHANGE UP (ref 3.5–5.3)
RBC # BLD: 4.55 M/UL — SIGNIFICANT CHANGE UP (ref 4.2–5.8)
RBC # FLD: 12.6 % — SIGNIFICANT CHANGE UP (ref 10.3–14.5)
SODIUM SERPL-SCNC: 140 MMOL/L — SIGNIFICANT CHANGE UP (ref 135–145)
WBC # BLD: 4.59 K/UL — SIGNIFICANT CHANGE UP (ref 3.8–10.5)
WBC # FLD AUTO: 4.59 K/UL — SIGNIFICANT CHANGE UP (ref 3.8–10.5)

## 2021-12-03 PROCEDURE — 93010 ELECTROCARDIOGRAM REPORT: CPT

## 2021-12-03 RX ORDER — LEVOTHYROXINE SODIUM 125 MCG
0 TABLET ORAL
Qty: 0 | Refills: 0 | DISCHARGE

## 2021-12-03 NOTE — H&P PST ADULT - NSICDXPASTMEDICALHX_GEN_ALL_CORE_FT
PAST MEDICAL HISTORY:  BPH (benign prostatic hyperplasia)     Dementia     HTN (hypertension) Off meds x many years    Hypothyroidism

## 2021-12-03 NOTE — H&P PST ADULT - PROBLEM SELECTOR PLAN 1
left inguinal hernia repair open  Pre-op instructions given by RN, patient verbalized understanding  Chlorhexidine wash instructions given  Medical clearance

## 2021-12-03 NOTE — H&P PST ADULT - HISTORY OF PRESENT ILLNESS
87M pm .... c/o .... 2/2 ... here for PST for scheduled ..... 87M pmh hypothyroid, bph, RLS, Parkinson's disease c/o left groin pain 2/2 unilateral inguinal hernia here for PST for scheduled Left inguinal hernia repair open  This patient denies any fever, cough, sob, flu like symptoms or travel outside of the [place] in the past 30 days  COVID vaccination series including booster completed

## 2021-12-03 NOTE — H&P PST ADULT - NSALCOHOLFREQ_GEN_A_CORE_SD
location identified, draped/prepped, sterile technique used/blood seen on insertion/dressing applied/flushes easily/secured in place
monthly or less

## 2021-12-03 NOTE — H&P PST ADULT - ASSESSMENT
87M pmh hypothyroid, bph, RLS, Parkinson's disease c/o left groin pain 2/2 unilateral inguinal hernia here for PST for scheduled Left inguinal hernia repair open  CAPRINI SCORE    AGE RELATED RISK FACTORS                                                       MOBILITY RELATED FACTORS  [ ] Age 41-60 years                                            (1 Point)                  [ ] Bed rest                                                        (1 Point)  [ ] Age: 61-74 years                                           (2 Points)                [ ] Plaster cast                                                   (2 Points)  [x ] Age= 75 years                                              (3 Points)                 [ ] Bed bound for more than 72 hours                   (2 Points)    DISEASE RELATED RISK FACTORS                                               GENDER SPECIFIC FACTORS  [x ] Edema in the lower extremities                       (1 Point)                  [ ] Pregnancy                                                     (1 Point)  [ ] Varicose veins                                               (1 Point)                  [ ] Post-partum < 6 weeks                                   (1 Point)             [x ] BMI > 25 Kg/m2                                            (1 Point)                  [ ] Hormonal therapy  or oral contraception            (1 Point)                 [ ] Sepsis (in the previous month)                        (1 Point)                  [ ] History of pregnancy complications  [ ] Pneumonia or serious lung disease                                               [ ] Unexplained or recurrent                       (1 Point)           (in the previous month)                               (1 Point)  [ ] Abnormal pulmonary function test                     (1 Point)                 SURGERY RELATED RISK FACTORS  [ ] Acute myocardial infarction                              (1 Point)                 [ ]  Section                                            (1 Point)  [ ] Congestive heart failure (in the previous month)  (1 Point)                 [ ] Minor surgery                                                 (1 Point)   [ ] Inflammatory bowel disease                             (1 Point)                 [ ] Arthroscopic surgery                                        (2 Points)  [ ] Central venous access                                    (2 Points)                [x ] General surgery lasting more than 45 minutes   (2 Points)       [ ] Stroke (in the previous month)                          (5 Points)               [ ] Elective arthroplasty                                        (5 Points)                                                                                                                                               HEMATOLOGY RELATED FACTORS                                                 TRAUMA RELATED RISK FACTORS  [ ] Prior episodes of VTE                                     (3 Points)                 [ ] Fracture of the hip, pelvis, or leg                       (5 Points)  [ ] Positive family history for VTE                         (3 Points)                 [ ] Acute spinal cord injury (in the previous month)  (5 Points)  [ ] Prothrombin 91602 A                                      (3 Points)                 [ ] Paralysis  (less than 1 month)                          (5 Points)  [ ] Factor V Leiden                                             (3 Points)                 [ ] Multiple Trauma within 1 month                         (5 Points)  [ ] Lupus anticoagulants                                     (3 Points)                                                           [ ] Anticardiolipin antibodies                                (3 Points)                                                       [ ] High homocysteine in the blood                      (3 Points)                                             [ ] Other congenital or acquired thrombophilia       (3 Points)                                                [ ] Heparin induced thrombocytopenia                  (3 Points)                                          Total Score [     7     ]

## 2021-12-03 NOTE — H&P PST ADULT - NSANTHOSAYNRD_GEN_A_CORE
No. MUSA screening performed.  STOP BANG Legend: 0-2 = LOW Risk; 3-4 = INTERMEDIATE Risk; 5-8 = HIGH Risk

## 2021-12-04 ENCOUNTER — APPOINTMENT (OUTPATIENT)
Dept: DISASTER EMERGENCY | Facility: CLINIC | Age: 86
End: 2021-12-04

## 2021-12-04 LAB — SARS-COV-2 N GENE NPH QL NAA+PROBE: NOT DETECTED

## 2021-12-06 ENCOUNTER — TRANSCRIPTION ENCOUNTER (OUTPATIENT)
Age: 86
End: 2021-12-06

## 2021-12-07 ENCOUNTER — OUTPATIENT (OUTPATIENT)
Dept: OUTPATIENT SERVICES | Facility: HOSPITAL | Age: 86
LOS: 1 days | Discharge: HOME | End: 2021-12-07
Payer: MEDICARE

## 2021-12-07 ENCOUNTER — APPOINTMENT (OUTPATIENT)
Dept: SURGERY | Facility: HOSPITAL | Age: 86
End: 2021-12-07

## 2021-12-07 VITALS
SYSTOLIC BLOOD PRESSURE: 121 MMHG | HEIGHT: 65 IN | HEART RATE: 54 BPM | RESPIRATION RATE: 14 BRPM | DIASTOLIC BLOOD PRESSURE: 72 MMHG | WEIGHT: 266.76 LBS

## 2021-12-07 VITALS
RESPIRATION RATE: 20 BRPM | DIASTOLIC BLOOD PRESSURE: 88 MMHG | OXYGEN SATURATION: 97 % | HEART RATE: 55 BPM | SYSTOLIC BLOOD PRESSURE: 164 MMHG

## 2021-12-07 DIAGNOSIS — Z90.79 ACQUIRED ABSENCE OF OTHER GENITAL ORGAN(S): Chronic | ICD-10-CM

## 2021-12-07 LAB — GLUCOSE BLDC GLUCOMTR-MCNC: 93 MG/DL — SIGNIFICANT CHANGE UP (ref 70–99)

## 2021-12-07 PROCEDURE — 49505 PRP I/HERN INIT REDUC >5 YR: CPT | Mod: GC

## 2021-12-07 RX ORDER — LEVOTHYROXINE SODIUM 125 MCG
1 TABLET ORAL
Qty: 0 | Refills: 0 | DISCHARGE

## 2021-12-07 RX ORDER — SODIUM CHLORIDE 9 MG/ML
3 INJECTION INTRAMUSCULAR; INTRAVENOUS; SUBCUTANEOUS EVERY 8 HOURS
Refills: 0 | Status: DISCONTINUED | OUTPATIENT
Start: 2021-12-07 | End: 2021-12-07

## 2021-12-07 RX ORDER — DONEPEZIL HYDROCHLORIDE 10 MG/1
1 TABLET, FILM COATED ORAL
Qty: 0 | Refills: 0 | DISCHARGE

## 2021-12-07 RX ORDER — ACETAMINOPHEN 500 MG
1000 TABLET ORAL ONCE
Refills: 0 | Status: COMPLETED | OUTPATIENT
Start: 2021-12-07 | End: 2021-12-07

## 2021-12-07 RX ORDER — ONDANSETRON 8 MG/1
4 TABLET, FILM COATED ORAL ONCE
Refills: 0 | Status: DISCONTINUED | OUTPATIENT
Start: 2021-12-07 | End: 2021-12-07

## 2021-12-07 RX ORDER — SODIUM CHLORIDE 9 MG/ML
1000 INJECTION, SOLUTION INTRAVENOUS
Refills: 0 | Status: DISCONTINUED | OUTPATIENT
Start: 2021-12-07 | End: 2021-12-07

## 2021-12-07 RX ORDER — CARBIDOPA AND LEVODOPA 25; 100 MG/1; MG/1
1 TABLET ORAL
Qty: 0 | Refills: 0 | DISCHARGE

## 2021-12-07 RX ORDER — FINASTERIDE 5 MG/1
1 TABLET, FILM COATED ORAL
Qty: 0 | Refills: 0 | DISCHARGE

## 2021-12-07 RX ORDER — ROPINIROLE 8 MG/1
1 TABLET, FILM COATED, EXTENDED RELEASE ORAL
Qty: 0 | Refills: 0 | DISCHARGE

## 2021-12-07 RX ADMIN — Medication 400 MILLIGRAM(S): at 14:40

## 2021-12-07 RX ADMIN — SODIUM CHLORIDE 50 MILLILITER(S): 9 INJECTION, SOLUTION INTRAVENOUS at 14:41

## 2021-12-07 NOTE — BRIEF OPERATIVE NOTE - NSICDXBRIEFPROCEDURE_GEN_ALL_CORE_FT
PROCEDURES:  Open repair of inguinal hernia using mesh in adult 07-Dec-2021 14:01:16  Cherise Castellano

## 2021-12-07 NOTE — ASU DISCHARGE PLAN (ADULT/PEDIATRIC) - NS MD DC FALL RISK RISK
For information on Fall & Injury Prevention, visit: [email or web address] OR  [email or web address] OR  https://www.cdc.gov/steadi/patient.html

## 2021-12-07 NOTE — ASU PATIENT PROFILE, ADULT - NS PRO ABUSE SCREEN SUSPICION NEGLECT YN
Sickle Cell Crisis: Care Instructions  Your Care Instructions    Sickle cell crisis is a painful episode that may begin suddenly in a person with sickle cell disease. Sickle cell disease turns normal, round red blood cells into cells that look like jeane or crescent moons. The sickle-shaped cells can get stuck in blood vessels, blocking blood flow and causing severe pain. The pain can occur in the bones of the spine, the arms and legs, the chest, and the abdomen. An episode may be called a \"painful event\" or \"painful crisis. \" Some people who have sickle cell disease have many painful events, while others have few or none. Treatment depends on the level of pain and how long it lasts. Sometimes taking nonprescription pain relievers can help. Or you may need stronger pain relief medicine that is prescribed or given by a doctor. You may need to be treated in the hospital.  It isn't always possible to know what sets off a painful event. But triggers include being dehydrated, cold temperatures, infection, stress, and not getting enough oxygen. Follow-up care is a key part of your treatment and safety. Be sure to make and go to all appointments, and call your doctor if you are having problems. It's also a good idea to know your test results and keep a list of the medicines you take. How can you care for yourself at home? · Create a pain management plan with your doctor. This plan should include the types of medicines you can take and other actions you can take at home to relieve pain. · Drink plenty of fluids, enough so that your urine is light yellow or clear like water. If you have kidney, heart, or liver disease and have to limit fluids, talk with your doctor before you increase the amount of fluids you drink. · Take your medicines exactly as prescribed. Call your doctor if you think you are having a problem with your medicine. · Take pain medicines exactly as directed.   ¨ If the doctor gave you a prescription medicine for pain, take it as prescribed. ¨ If you are not taking a prescription pain medicine, ask your doctor if you can take an over-the-counter medicine. · Avoid alcohol. It can make you dehydrated. · Dress warmly in cold weather. The cold and windy weather can lead to severe pain. · Do not smoke. Smoking can reduce the amount of oxygen in your blood. · Get plenty of sleep. When should you call for help? Call 911 anytime you think you may need emergency care. For example, call if:  · You passed out (lost consciousness). Call your doctor now or seek immediate medical care if:  · You are in severe pain. · You are dizzy or lightheaded, or you feel like you may faint. · You have a fever. · You are short of breath. · Your symptoms are getting worse. Watch closely for changes in your health, and be sure to contact your doctor if you are not getting better as expected. Where can you learn more? Go to http://manuel-amadeo.info/. Enter F104 in the search box to learn more about \"Sickle Cell Crisis: Care Instructions. \"  Current as of: October 13, 2016  Content Version: 11.3  © 2059-9671 [employer]. Care instructions adapted under license by [employer] (which disclaims liability or warranty for this information). If you have questions about a medical condition or this instruction, always ask your healthcare professional. [place] [address] any warranty or liability for your use of this information. no

## 2021-12-07 NOTE — ASU DISCHARGE PLAN (ADULT/PEDIATRIC) - CARE PROVIDER_API CALL
Eliazar De La Rosa)  ColonRectal Surgery; Surgery  [address] [place] [address]  Phone: [phone]  Fax: [phone]  Follow Up Time:

## 2021-12-07 NOTE — ASU PATIENT PROFILE, ADULT - FALL HARM RISK - HARM RISK INTERVENTIONS

## 2021-12-07 NOTE — ASU DISCHARGE PLAN (ADULT/PEDIATRIC) - ASU DC SPECIAL INSTRUCTIONSFT
PAIN: Take Tylenol 100mg every 6 hours   WOUND: Please keep incisions clean and dry. Please do not scrub or rub incisions. Please to do not apply lotion or powder on incisions.   BATH: Please do not submerge wound under water. You may shower or use sponge bath.  ACTIVITY: No heavy lifting or straining until your follow up appointment. Otherwise, you may return to your usual level of physical activity.   DIET: Return to your usual diet.  NOTIFY YOUR SURGEON IF: You have any bleeding that does not stop, any pus draining from your wound(s), any fever (over 100.4 F) or chills, persistent nausea/vomiting, persistent diarrhea, or if your pain is not controlled on your discharge pain medications.  FOLLOW-UP: Please follow-up with your surgeon, within 1-2 weeks following discharge- please call to schedule an appointment.

## 2021-12-09 PROBLEM — F03.90 UNSPECIFIED DEMENTIA, UNSPECIFIED SEVERITY, WITHOUT BEHAVIORAL DISTURBANCE, PSYCHOTIC DISTURBANCE, MOOD DISTURBANCE, AND ANXIETY: Chronic | Status: ACTIVE | Noted: 2021-12-03

## 2021-12-09 PROBLEM — I10 ESSENTIAL (PRIMARY) HYPERTENSION: Chronic | Status: ACTIVE | Noted: 2018-07-07

## 2021-12-09 PROBLEM — Z86.69 PERSONAL HISTORY OF OTHER DISEASES OF THE NERVOUS SYSTEM AND SENSE ORGANS: Chronic | Status: ACTIVE | Noted: 2021-12-07

## 2021-12-09 NOTE — REVIEW OF SYSTEMS
[Eyesight Problems] : eyesight problems [Constipation] : constipation [Incontinence] : incontinence [Loss Of Hearing] : no hearing loss [Chest Pain] : no chest pain [Palpitations] : no palpitations [Lower Ext Edema] : no extremity edema [SOB on Exertion] : no shortness of breath during exertion [FreeTextEntry3] : wears [FreeTextEntry8] : some urinary incontinence

## 2021-12-09 NOTE — PHYSICAL EXAM
[Alert] : alert [No Acute Distress] : in no acute distress [Sclera] : the sclera and conjunctiva were normal [EOMI] : extraocular movements were intact [Normal Outer Ear/Nose] : the ears and nose were normal in appearance [Normal Appearance] : the appearance of the neck was normal [Supple] : the neck was supple [JVD] : there was no jugular-venous distention [No Respiratory Distress] : no respiratory distress [No Acc Muscle Use] : no accessory muscle use [Respiration, Rhythm And Depth] : normal respiratory rhythm and effort [Auscultation Breath Sounds / Voice Sounds] : lungs were clear to auscultation bilaterally [Normal S1, S2] : normal S1 and S2 [Heart Rate And Rhythm] : heart rate was normal and rhythm regular [Normal] : normal bowel sounds, non-tender [No Clubbing, Cyanosis] : no clubbing or cyanosis of the fingernails [Normal Color / Pigmentation] : normal skin color and pigmentation [Normal Affect] : the affect was normal [Normal Mood] : the mood was normal [de-identified] : edema [de-identified] : unsteady slow gait, poor control upon sitting, shuffling gait [de-identified] : hypophonia

## 2021-12-09 NOTE — ASSESSMENT
[FreeTextEntry1] : bilateral leg pain:\par ddx neuropathy (risk factors include prediabetes, and associated low back pain and unsteady gait ? spinal stenosis) vs PAD vs RLS \par -will check vascular studies\par \par sleep disturbance:\par combination nocturia 3-6, has bedside urinal and commode, no tamsulosin due to hypotension\par rls contributes\par and APS ? REM sleep disorder\par \par left shoulder pain\par check XRay\par \par atypical Parkinsons disease:\par referral to local neurologist \par \par unsteady gait:\par recommend walker\par transport wheelchair\par fall precautions\par HHA: needs to set up for HHA for help at home\par information provided\par \par \par \par

## 2021-12-09 NOTE — HISTORY OF PRESENT ILLNESS
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 Segundo Bruno  urologist\par hx of prostate ablation\par \par \par lives with wife \par he has help with cooking \par \par functional status:\par worse gait over last 1 year, worse over last 6 months\par started home PT last week.\par walks with cane\par May 2021: fall required ER visit for facial bruising, no fxr but with worse left arm ROM since the fall\par \par independent with ADL's\par needs help with IADL's\par \par neurology evaluation:\par Dr. Sherrill Collins at [facility] has had memory testing dtr reports moderate dementia\par atypical Parkinsons syndrome\par started sinemet 25/100 1/2 tab twice day in May 2021\par dtr notes improved hypophonia\par overall unchanged gait/poor balance\par weight loss\par constipation: colace daily, recommend starting senna\par \par sleep disturbance:\par ropirinole and deep breaths\par sleep study in May 2021: negative for sleep apnea\par ? REM sleep disorder possible per dtr\par \par hx of prediabetes that is diet controlled\par \par notes neuropathy: numbness of b/l lets? releated to RLS\par sometimes with low back pain when standing for prolonged time but it is minimal\par tried gabapentin for only for a few days.\par   \par \par he denies CV or kidney disease.\par \par left shoulder pain: worse from the fall but no xray\par \par ACP:\par planning for seeing  tomorrow for living will, power of  and hcp\par discussed completing today, but they want to complete tomorrow with \par \par 1 children\par son: Segundo Chacko\par dtr: Citlali Chaney  (md pulm crit)\par dtr: Marilyn Chacko \par \par \par neurology Dr. sherrill Martinez [phone]\par Neuro tammy Coronel [phone]\par [facility]: Dr. Pruett [phone]\par Endocrinology: dr. oren Lagos [phone] [TextBox_43] : implant

## 2021-12-13 ENCOUNTER — APPOINTMENT (OUTPATIENT)
Dept: SURGERY | Facility: CLINIC | Age: 86
End: 2021-12-13
Payer: MEDICARE

## 2021-12-13 VITALS
WEIGHT: 121 LBS | TEMPERATURE: 98.2 F | HEART RATE: 67 BPM | DIASTOLIC BLOOD PRESSURE: 71 MMHG | HEIGHT: 64 IN | BODY MASS INDEX: 20.66 KG/M2 | SYSTOLIC BLOOD PRESSURE: 112 MMHG

## 2021-12-13 DIAGNOSIS — Z09 ENCOUNTER FOR FOLLOW-UP EXAMINATION AFTER COMPLETED TREATMENT FOR CONDITIONS OTHER THAN MALIGNANT NEOPLASM: ICD-10-CM

## 2021-12-13 DIAGNOSIS — Z87.438 PERSONAL HISTORY OF OTHER DISEASES OF MALE GENITAL ORGANS: ICD-10-CM

## 2021-12-13 DIAGNOSIS — Z86.59 PERSONAL HISTORY OF OTHER MENTAL AND BEHAVIORAL DISORDERS: ICD-10-CM

## 2021-12-13 DIAGNOSIS — Z86.39 PERSONAL HISTORY OF OTHER ENDOCRINE, NUTRITIONAL AND METABOLIC DISEASE: ICD-10-CM

## 2021-12-13 DIAGNOSIS — Z86.69 PERSONAL HISTORY OF OTHER DISEASES OF THE NERVOUS SYSTEM AND SENSE ORGANS: ICD-10-CM

## 2021-12-13 DIAGNOSIS — Z78.9 OTHER SPECIFIED HEALTH STATUS: ICD-10-CM

## 2021-12-13 DIAGNOSIS — Z83.49 FAMILY HISTORY OF OTHER ENDOCRINE, NUTRITIONAL AND METABOLIC DISEASES: ICD-10-CM

## 2021-12-13 PROCEDURE — 99024 POSTOP FOLLOW-UP VISIT: CPT

## 2021-12-13 NOTE — ASSESSMENT
[FreeTextEntry1] : Patient is well, states some discomfort relieved with tylenol. Also c/o cramping which he had prior to surgery.\par \par \par incision is c/d/i and healing well. No swelling, minimal ecchymosis.\par \par \par f/u 3-4 weeks if cramping persists. Discussed possible GI workup if no improvement.

## 2021-12-17 DIAGNOSIS — K40.90 UNILATERAL INGUINAL HERNIA, WITHOUT OBSTRUCTION OR GANGRENE, NOT SPECIFIED AS RECURRENT: ICD-10-CM

## 2021-12-17 DIAGNOSIS — G20 PARKINSON'S DISEASE: ICD-10-CM

## 2021-12-17 DIAGNOSIS — F02.80 DEMENTIA IN OTHER DISEASES CLASSIFIED ELSEWHERE, UNSPECIFIED SEVERITY, WITHOUT BEHAVIORAL DISTURBANCE, PSYCHOTIC DISTURBANCE, MOOD DISTURBANCE, AND ANXIETY: ICD-10-CM

## 2021-12-17 DIAGNOSIS — E03.9 HYPOTHYROIDISM, UNSPECIFIED: ICD-10-CM

## 2021-12-17 DIAGNOSIS — N40.0 BENIGN PROSTATIC HYPERPLASIA WITHOUT LOWER URINARY TRACT SYMPTOMS: ICD-10-CM

## 2021-12-17 DIAGNOSIS — E55.9 VITAMIN D DEFICIENCY, UNSPECIFIED: ICD-10-CM

## 2022-01-12 ENCOUNTER — APPOINTMENT (OUTPATIENT)
Dept: SURGERY | Facility: CLINIC | Age: 87
End: 2022-01-12

## 2022-01-21 ENCOUNTER — APPOINTMENT (OUTPATIENT)
Dept: NEUROLOGY | Facility: CLINIC | Age: 87
End: 2022-01-21
Payer: MEDICARE

## 2022-01-21 PROCEDURE — 99205 OFFICE O/P NEW HI 60 MIN: CPT | Mod: 95

## 2022-01-21 NOTE — PHYSICAL EXAM
[FreeTextEntry1] : Patient is awake and alert.  He is pleasant and cooperative with the exam.\par Appears in no distress\par Facial expression is significantly reduced speech is dysarthric sometimes difficult to understand\par No resting action or postural tremors are seen\par Bilateral bradykinesia\par Hand opening and closing 2 bilaterally left slightly slower than right\par Rapid alternating movements one on the right 2 on the left\par Finger taps 2 on the right 3 on the left\par Difficulty getting up from the chair\par

## 2022-01-21 NOTE — DISCUSSION/SUMMARY
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  His symptoms have been present since at least 2012.  Initial symptoms were double vision cognitive slowing slowness in daily activities gait instability walking disturbance since 2016\par Fluctuation in blood pressure on starting Sinemet slight improvement of symptoms on Sinemet.  He has only been on small doses because of the drop in blood pressure.\par Currently on controlled release tablet twice a day\par Impression-parkinsonism atypical versus idiopathic\par \par Plan\par The patient's daughter is visiting him and is there for the next few days.  I have asked to gently increase levodopa so that he is on 1-1/2 tablets twice a day.  They will monitor blood pressure closely both sitting and standing\par Prescription for physical therapy occupational and speech therapy would be mailed to their home address\par All medications were renewed including Sinemet, Namenda and donepezil\par Techniques to help with drooling were discussed\par Fall precautions were discussed\par Also had discussion about availability of DaTscan and that it may not  \par Discussed the limitation of exam over telehealth\par Advised to follow-up in the clinic for a more detailed neurological exam\par All questions were addressed and answered

## 2022-01-21 NOTE — HISTORY OF PRESENT ILLNESS
[Home] : at home, [unfilled] , at the time of the visit. [Other Location: e.g. Home (Enter Location, City,State)___] : at [unfilled] [Formal Caregiver] : formal caregiver [Verbal consent obtained from patient] : the patient, [unfilled] [FreeTextEntry4] : daughter Citlali RICARDO critical care and health care proxy [FreeTextEntry1] : Mr. Chacko is an 87-year-old right-handed male who is accompanied by his daughter Citlali for a second opinion for parkinsonism.\par Citlali is a critical care and pulmonary physician.  Her father is a retired OB/GYN.\par They are seeking a second opinion for possible atypical parkinsonism.  History is obtained from both of them and from review of records\par \par \par His initial symptoms were changes in memory which started around 2010 where family noticed cognitive slowing difficulty with comprehension and executive functioning etc.  \par In 2012 he developed double vision mostly with upward gaze.  He was treated as presumptive myasthenia gravis on Mestinon.  He had negative serology and EMG at that time.  He was on Mestinon for about 4 years but his double vision and drooling were getting worse and hence Mestinon was stopped.\par In 2016 he started developing gait instability.  He did not have any falls but was having slowness of movement.  In 2021 he fell for the first time and had 3 falls.\par MRI of the brain was unremarkable DaTscan not done he was seen by Dr. Remington Martinez at [facility] who diagnosed him with parkinsonism and initiated him on Sinemet.  He was taking 1 tablet twice a day with some improvement mostly noticed by the patient as slight improvement in mobility however the family did not notice much improvement may be the tremor which was minimal to better begin with was less.  However his blood pressure dropped with Sinemet.  This was later changed to controlled release which she has been tolerating well.\par He also saw an ophthalmologist who examined him and said he had no extraocular movement abnormalities.\par \par Patient was having significant difficulty swallowing with the drooling which has been better since Mestinon has been stopped.  His swallowing is still slow he uses the chin tuck maneuver.  He had been working with a speech therapist.\par \par Blood pressure tends to be labile especially since levodopa was started\par Tremor was minimal to begin with mostly on the left and has been better since levodopa was started\par Gait is festinating posture is stooped forward\par Speech is slurred and hypophonic\par He had one vivid dream denies any hallucinations during daytime\par Reports slowness in all daily activities and stiffness in his neck.  Has difficulty getting up from the chair. \par \par He lives alone with his wife who also has dementia.  They have help during daytime someone who helps cook and light housekeeping.  They also have an aide at bedtime.\par \par He was initiated on donepezil and Namenda in 2021.  He has been tolerating those well.  He is on Namenda 5 mg twice a day and donepezil 10 mg at bedtime\par He ambulates with a cane although a walker has been suggested.  The 3 times he had a fall he was not using his cane either.  Currently undergoing physical therapy twice a week\par \par Had tried mirtazapine at bedtime to help with appetite and to help him sleep better at night however after 2-week trial patient decided it was not helpful and stopped using it.\par He has history of anxiety in the past and continues to have irritability\par No hallucinations\par Has been incontinent of urine for more than 10 years he also had TURP done in 2010\par \par Has history of RLS is on ropinirole 0.25 mg at bedtime\par \par Review of systems-a complete review of systems is performed and is negative except as listed in HPI\par \par

## 2022-01-24 ENCOUNTER — APPOINTMENT (OUTPATIENT)
Dept: GERIATRICS | Facility: CLINIC | Age: 87
End: 2022-01-24
Payer: MEDICARE

## 2022-01-24 ENCOUNTER — TRANSCRIPTION ENCOUNTER (OUTPATIENT)
Age: 87
End: 2022-01-24

## 2022-01-24 VITALS
RESPIRATION RATE: 15 BRPM | OXYGEN SATURATION: 96 % | HEART RATE: 67 BPM | SYSTOLIC BLOOD PRESSURE: 98 MMHG | WEIGHT: 127 LBS | BODY MASS INDEX: 21.8 KG/M2 | TEMPERATURE: 97.9 F | DIASTOLIC BLOOD PRESSURE: 62 MMHG

## 2022-01-24 VITALS — SYSTOLIC BLOOD PRESSURE: 102 MMHG | DIASTOLIC BLOOD PRESSURE: 66 MMHG

## 2022-01-24 DIAGNOSIS — Z13.820 ENCOUNTER FOR SCREENING FOR OSTEOPOROSIS: ICD-10-CM

## 2022-01-24 PROCEDURE — 99214 OFFICE O/P EST MOD 30 MIN: CPT | Mod: GC

## 2022-01-24 RX ORDER — MIRTAZAPINE 7.5 MG/1
7.5 TABLET, FILM COATED ORAL
Qty: 30 | Refills: 0 | Status: DISCONTINUED | COMMUNITY
Start: 2021-10-01 | End: 2022-01-24

## 2022-01-24 RX ORDER — MEMANTINE HYDROCHLORIDE 5 MG/1
5 TABLET, FILM COATED ORAL
Qty: 60 | Refills: 0 | Status: DISCONTINUED | COMMUNITY
Start: 2021-11-18 | End: 2022-01-24

## 2022-01-24 RX ORDER — TAMSULOSIN HYDROCHLORIDE 0.4 MG/1
CAPSULE ORAL
Refills: 0 | Status: DISCONTINUED | COMMUNITY
End: 2022-01-24

## 2022-01-25 ENCOUNTER — TRANSCRIPTION ENCOUNTER (OUTPATIENT)
Age: 87
End: 2022-01-25

## 2022-01-25 NOTE — REVIEW OF SYSTEMS
[As Noted in HPI] : as noted in HPI [Feeling Tired] : feeling tired [Nocturia] : nocturia [Negative] : Psychiatric [de-identified] : + numbness feeling of his big toes B/L

## 2022-01-25 NOTE — ASSESSMENT
[FreeTextEntry1] : Daughter from [place] present for all of the above and agreed with all of the above management. all questions answered and reviewed

## 2022-01-25 NOTE — HISTORY OF PRESENT ILLNESS
[FreeTextEntry1] : CC : Orthostasis, fatigue and malaise \par \par HPI : Patient here with daughter and wife, follow up visit after visit from Neurologist on Friday 1/21/2022.\par Patient states that he has been feeling increasing fatigue and sleepiness since increase of namenda and change in dosing of \par Carbi-leva dopa. Patient recently had surgery of a inguinal hernia that was causing symptoms. He no longer has symptoms of hernia and tolerated surgery well. Patient denies sob, CP, fever, and bleeding. \par \par Parkinson's disease : \par recent additional  dose added short acting after visit from neurologist \par denies difficulty swallowing \par currently has rehab, and OT \par \par Orthostasis : intermittent \par unclear if medication related or volume related \par \par Preventative Health / Immunization : \par has  PNA vaccine, COVID booster, and flu shot \par Zoster vaccine not completed \par \par \par

## 2022-01-25 NOTE — PHYSICAL EXAM
[Alert] : alert [Looks Tired] : appears tired [Sensation] : the sensory exam was normal to light touch and pinprick [No Focal Deficits] : no focal deficits [Normal] : normal affect and normal mood [JVD] : there was jugular-venous distention [Normal Gait] : abnormal gait [de-identified] : + scar inguinal area left side healed  [de-identified] : + shuffling gait

## 2022-01-31 ENCOUNTER — TRANSCRIPTION ENCOUNTER (OUTPATIENT)
Age: 87
End: 2022-01-31

## 2022-02-07 ENCOUNTER — TRANSCRIPTION ENCOUNTER (OUTPATIENT)
Age: 87
End: 2022-02-07

## 2022-02-16 ENCOUNTER — APPOINTMENT (OUTPATIENT)
Dept: ORTHOPEDIC SURGERY | Facility: CLINIC | Age: 87
End: 2022-02-16

## 2022-02-22 ENCOUNTER — APPOINTMENT (OUTPATIENT)
Dept: ORTHOPEDIC SURGERY | Facility: CLINIC | Age: 87
End: 2022-02-22

## 2022-03-04 ENCOUNTER — OUTPATIENT (OUTPATIENT)
Dept: OUTPATIENT SERVICES | Facility: HOSPITAL | Age: 87
LOS: 1 days | End: 2022-03-04
Payer: MEDICARE

## 2022-03-04 ENCOUNTER — RESULT REVIEW (OUTPATIENT)
Age: 87
End: 2022-03-04

## 2022-03-04 ENCOUNTER — APPOINTMENT (OUTPATIENT)
Dept: RADIOLOGY | Facility: CLINIC | Age: 87
End: 2022-03-04
Payer: MEDICARE

## 2022-03-04 DIAGNOSIS — Z00.00 ENCOUNTER FOR GENERAL ADULT MEDICAL EXAMINATION WITHOUT ABNORMAL FINDINGS: ICD-10-CM

## 2022-03-04 DIAGNOSIS — Z90.79 ACQUIRED ABSENCE OF OTHER GENITAL ORGAN(S): Chronic | ICD-10-CM

## 2022-03-04 PROCEDURE — 77080 DXA BONE DENSITY AXIAL: CPT | Mod: 26

## 2022-03-04 PROCEDURE — 77080 DXA BONE DENSITY AXIAL: CPT

## 2022-03-10 ENCOUNTER — NON-APPOINTMENT (OUTPATIENT)
Age: 87
End: 2022-03-10

## 2022-03-16 ENCOUNTER — TRANSCRIPTION ENCOUNTER (OUTPATIENT)
Age: 87
End: 2022-03-16

## 2022-04-11 ENCOUNTER — APPOINTMENT (OUTPATIENT)
Dept: NEUROLOGY | Facility: CLINIC | Age: 87
End: 2022-04-11
Payer: MEDICARE

## 2022-04-11 VITALS
OXYGEN SATURATION: 93 % | SYSTOLIC BLOOD PRESSURE: 120 MMHG | HEIGHT: 64 IN | HEART RATE: 65 BPM | WEIGHT: 125 LBS | BODY MASS INDEX: 21.34 KG/M2 | DIASTOLIC BLOOD PRESSURE: 71 MMHG

## 2022-04-11 VITALS — OXYGEN SATURATION: 96 % | HEART RATE: 69 BPM | SYSTOLIC BLOOD PRESSURE: 118 MMHG | DIASTOLIC BLOOD PRESSURE: 78 MMHG

## 2022-04-11 PROCEDURE — 99215 OFFICE O/P EST HI 40 MIN: CPT

## 2022-04-11 NOTE — DISCUSSION/SUMMARY
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  His symptoms have been present since at least 2010.  Initial symptoms were double vision cognitive slowing slowness in daily activities gait instability walking disturbance since 2016\par Fluctuation in blood pressure on starting Sinemet slight? improvement of symptoms on Sinemet.  He has only been on small doses because of the drop in blood pressure.  No tremors\par Currently on controlled release tablet twice a day\par Impression-parkinsonism atypical versus idiopathic\par As per daughter had a recent MRI of the brain in October which was normal.  \par \par Plan\par Patient was unable to tolerate increase in Sinemet.  Family finds him to be groggy 2 hours after each dose.  Will gradually taper off by half a tablet in the afternoon dose to see if it makes any difference to his sleepiness or mobility.  If there is no difference may continue to taper off till he is off Sinemet.  In case they notice that his parkinsonian features are getting worse they will go back to the same dose of Sinemet and perhaps try coffee or increased stimulation.  Patient reports having recent sleep study that was negative for sleep apnea\par Continue PT\par Asked to monitor swallowing.  Denies any dysphagia at present time\par -Azilect or Nourianz could be tried in the future\par Techniques to help with drooling were discussed\par Fall precautions were discussed\par Also had discussion about availability of DaTscan and that it may not  \par Patient and daughter had several questions were were addressed and answered, total face-to-face time spent with the patient 50 minutes\par Follow-up in 6 to 8 weeks

## 2022-04-11 NOTE — PHYSICAL EXAM
[FreeTextEntry1] : Patient is awake and alert.  He is pleasant and cooperative with the exam.\par Appears in no distress\par Facial expression is significantly reduced speech is dysarthric sometimes difficult to understand\par No resting action or postural tremors are seen\par no nuchal rigidity\par DTRs are equal symmetric\par Bilateral bradykinesia\par Hand opening and closing 1 on the right 2 on the left\par Rapid alternating movements 2 on the right 3 on the left\par Finger taps 2 on the right 3 on the left\par Tone normal in the right, 1 on the left\par Extraocular movements are intact, prisms on glasses\par Difficulty getting up from the chair, was able to get up after 2-3 attempts.  Posture is very stooped steps are small en bloc turn.  He is able to ambulate independently but slowly\par applause sign positive

## 2022-04-11 NOTE — HISTORY OF PRESENT ILLNESS
[FreeTextEntry1] : Mr. Chacko is an 87-year-old right-handed male who is accompanied by his daughter Citlali for a second opinion for parkinsonism.\par Citlali is a critical care and pulmonary physician.  Her father is a retired OB/GYN.\par They are seeking a second opinion for possible atypical parkinsonism.  History is obtained from both of them and from review of records\par \par \par His initial symptoms were changes in memory which started around 2010 where family noticed cognitive slowing difficulty with comprehension and executive functioning etc.  \par In 2012 he developed double vision mostly with upward gaze.  He was treated as presumptive myasthenia gravis on Mestinon.  He had negative serology and EMG at that time.  He was on Mestinon for about 4 years but his double vision and drooling were getting worse and hence Mestinon was stopped.\par In 2016 he started developing gait instability.  He did not have any falls but was having slowness of movement.  In 2021 he fell for the first time and had 3 falls.\par MRI of the brain was unremarkable DaTscan not done he was seen by Dr. Remington Martinez at [facility] who diagnosed him with parkinsonism and initiated him on Sinemet.  He was taking 1 tablet twice a day with some improvement mostly noticed by the patient as slight improvement in mobility however the family did not notice much improvement may be the tremor which was minimal to better begin with was less.  However his blood pressure dropped with Sinemet.  This was later changed to controlled release which she has been tolerating well.\par He also saw an ophthalmologist who examined him and said he had no extraocular movement abnormalities.\par \par Patient was having significant difficulty swallowing with the drooling which has been better since Mestinon has been stopped.  His swallowing is still slow he uses the chin tuck maneuver.  He had been working with a speech therapist.\par \par Blood pressure tends to be labile especially since levodopa was started\par Tremor was minimal to begin with mostly on the left and has been better since levodopa was started\par Gait is festinating posture is stooped forward\par Speech is slurred and hypophonic\par He had one vivid dream denies any hallucinations during daytime\par Reports slowness in all daily activities and stiffness in his neck.  Has difficulty getting up from the chair. \par \par He lives alone with his wife who also has dementia.  They have help during daytime someone who helps cook and light housekeeping.  They also have an aide at bedtime.\par \par He was initiated on donepezil and Namenda in 2021.  He has been tolerating those well.  He is on Namenda 5 mg twice a day and donepezil 10 mg at bedtime\par He ambulates with a cane although a walker has been suggested.  The 3 times he had a fall he was not using his cane either.  Currently undergoing physical therapy twice a week\par \par Had tried mirtazapine at bedtime to help with appetite and to help him sleep better at night however after 2-week trial patient decided it was not helpful and stopped using it.\par He has history of anxiety in the past and continues to have irritability\par No hallucinations\par Has been incontinent of urine for more than 10 years he also had TURP done in 2010\par \par Has history of RLS is on ropinirole 0.25 mg at bedtime\par \par Review of systems-a complete review of systems is performed and is negative except as listed in HPI\par \par Interval history April 11, 2022.  Patient is accompanied by a different daughter Marilyn, OB/GYN specialist.  She states that they tried increasing Sinemet to 1-1/2 tablets twice a day however it dropped his blood pressure.  Currently patient is on Sinemet CR 25/100, 1 tablet in the morning and 1 in the afternoon.  He states that he does not notice any benefit with the medication and 2 hours after the pill he feels groggy.  No falls since May 2021.  He has frequent nocturia at times which interferes with his sleeping.  RLS is well controlled with ropinirole 0.25 mg at bedtime.  He is also on donepezil and Namenda\par \par

## 2022-04-25 ENCOUNTER — INPATIENT (INPATIENT)
Facility: HOSPITAL | Age: 87
LOS: 3 days | Discharge: NURSING, REHAB OR LONG-TERM CARE | End: 2022-04-29
Attending: GENERAL ACUTE CARE HOSPITAL | Admitting: GENERAL ACUTE CARE HOSPITAL
Payer: MEDICARE

## 2022-04-25 VITALS
TEMPERATURE: 98 F | SYSTOLIC BLOOD PRESSURE: 148 MMHG | DIASTOLIC BLOOD PRESSURE: 79 MMHG | HEART RATE: 72 BPM | RESPIRATION RATE: 18 BRPM | OXYGEN SATURATION: 97 %

## 2022-04-25 DIAGNOSIS — Z90.79 ACQUIRED ABSENCE OF OTHER GENITAL ORGAN(S): Chronic | ICD-10-CM

## 2022-04-25 PROCEDURE — 70450 CT HEAD/BRAIN W/O DYE: CPT | Mod: 26,MA

## 2022-04-25 PROCEDURE — 93010 ELECTROCARDIOGRAM REPORT: CPT

## 2022-04-25 PROCEDURE — 99285 EMERGENCY DEPT VISIT HI MDM: CPT | Mod: 25,GC

## 2022-04-25 PROCEDURE — 71045 X-RAY EXAM CHEST 1 VIEW: CPT | Mod: 26

## 2022-04-25 PROCEDURE — 72192 CT PELVIS W/O DYE: CPT | Mod: 26,MA

## 2022-04-25 PROCEDURE — 73552 X-RAY EXAM OF FEMUR 2/>: CPT | Mod: 26,RT

## 2022-04-25 PROCEDURE — 73502 X-RAY EXAM HIP UNI 2-3 VIEWS: CPT | Mod: 26,RT

## 2022-04-25 RX ORDER — ACETAMINOPHEN 500 MG
975 TABLET ORAL ONCE
Refills: 0 | Status: COMPLETED | OUTPATIENT
Start: 2022-04-25 | End: 2022-04-25

## 2022-04-25 RX ORDER — IBUPROFEN 200 MG
600 TABLET ORAL ONCE
Refills: 0 | Status: COMPLETED | OUTPATIENT
Start: 2022-04-25 | End: 2022-04-25

## 2022-04-25 RX ADMIN — Medication 600 MILLIGRAM(S): at 21:19

## 2022-04-25 RX ADMIN — Medication 975 MILLIGRAM(S): at 21:20

## 2022-04-25 NOTE — ED PROVIDER NOTE - PHYSICAL EXAMINATION
General: non-toxic appearing, in no respiratory distress  HEENT: atraumatic, normocephalic; pupils are equal, round and react to light, extraocular movements intact bilaterally without deficits, no conjunctival pallor, mucous membranes moist  Neck: no jugular venous distension, full range of motion  Chest/Lung: clear to auscultation bilaterally, no wheezes/rhonchi/rales  Heart: regular rate and rhythm, no murmur/gallops/rubs  Abdomen: normal bowel sounds, soft, non-tender, non-distended  Extremities: no lower extremity edema, +2 radial pulses bilaterally, +2 dorsalis pedis pulses bilaterally  Musculoskeletal: full range of motion of all 4 extremities, tenderness to palpation over right hip  Nervous System: alert and oriented, no motor deficits or sensory deficits; CNII-XII grossly intact; no focal neurologic deficits  Skin: no rashes/lacerations noted General: non-toxic appearing, in no respiratory distress  HEENT: atraumatic, normocephalic; pupils are equal, round and react to light, extraocular movements intact bilaterally without deficits, no conjunctival pallor, mucous membranes moist  Neck: no jugular venous distension, full range of motion  Chest/Lung: clear to auscultation bilaterally, no wheezes/rhonchi/rales  Heart: regular rate and rhythm, no murmur/gallops/rubs  Abdomen: normal bowel sounds, soft, non-tender, non-distended  Extremities: no lower extremity edema, +2 radial pulses bilaterally, +2 dorsalis pedis pulses bilaterally  Musculoskeletal: full range of motion of all 4 extremities, tenderness to palpation over right hip, no midline thoracic/lumbar tenderness  Nervous System: alert and oriented, no motor deficits or sensory deficits; CNII-XII grossly intact; no focal neurologic deficits  Skin: no rashes/lacerations noted

## 2022-04-25 NOTE — ED PROVIDER NOTE - PROGRESS NOTE DETAILS
STEPHEN Camacho, Attending: pain improved. Radiographs w/o obvious traumatic pathology on my wet read, CT ordered. Renaldo PGY1   Xray showed Acute subcapital fracture of the left femoral neck.   Ceci ortho. Renaldo PGY1   Xray showed acute subcapital fracture of the right femoral neck.   Ceci ortho. Renaldo PGY1  Discussed result with daughter via phone - daughter indicates that patient has PT tomorrow for gait and also aide at home with wheelchair. If no surgical intervention per ortho, daughter feels comfortable having the patient come home. Renaldo PGY1  Consulted ortho. Renaldo PGY1  Admitted to [facility] under Dr. Gonzalez.

## 2022-04-25 NOTE — ED PROVIDER NOTE - CLINICAL SUMMARY MEDICAL DECISION MAKING FREE TEXT BOX
Patient is a 88 year-old-male with history of atypical Parkinson's and dementia presents s/p witnessed mechanical fall, c/o right hip pain. Xray of pelvis, right hip. CTH. Reassess. Patient is a 88 year-old-male with history of atypical Parkinson's and dementia presents s/p witnessed mechanical fall, c/o right hip pain. Xray of pelvis, right hip. CTH. Reassess.    STEPHEN Camacho, Attending: seen with resident and reviewed note.    88 yoM, PMHx of atypical Parkinson's, new dx, known gait instability presenting with daughter (pulm/crit doc), after fall today. Mech in nature, likely fell due to gait instability. No recent illness. USOH. No head trauma. Pain in R hip but ambulatory. Improved with oral analgesia but worsened over day. Ambulatory but antalgic. No AC or AP meds. No chest/back/neck/abd/UE pain.    Looks well. Vitals reassuring. Primary intact. Non toxic. Conversant.    Painful ROM R hip. Worse with axial loading of femur. No wounds. FROM BUE/RAYNE. Thoracoabdomen, head, neck stable.     Needs evaluation for R femur/pelvis fx, possibly CT if radiographs non diagnostics. No indication for cross sectional imaging of neck/thoracoabdomined. Given hx of PD and likely cause of fall, no need for blood work. Discussed plan with daughter. Will monitor pain. Dispo pending diagnostics and sx.

## 2022-04-25 NOTE — ED PROVIDER NOTE - OBJECTIVE STATEMENT
Patient is a 88 year-old-male with history of atypical Parkinson's and dementia presents s/p mechanical fall. Had a mechanical fall in which patient fell forward in mid-morning, witnessed by wife. Patient denies any palpitation, shortness of breath, abdominal pain, nausea, vomiting, blurry vision prior to the fall, felt that he lost his footing and fell. C/o right hip pain, progressively worsen throughout the day, relived partially with tylenol at home. Able to ambulate after the fall and weight bearing.

## 2022-04-26 ENCOUNTER — TRANSCRIPTION ENCOUNTER (OUTPATIENT)
Age: 87
End: 2022-04-26

## 2022-04-26 ENCOUNTER — NON-APPOINTMENT (OUTPATIENT)
Age: 87
End: 2022-04-26

## 2022-04-26 DIAGNOSIS — M25.551 PAIN IN RIGHT HIP: ICD-10-CM

## 2022-04-26 DIAGNOSIS — S72.001A FRACTURE OF UNSPECIFIED PART OF NECK OF RIGHT FEMUR, INITIAL ENCOUNTER FOR CLOSED FRACTURE: ICD-10-CM

## 2022-04-26 DIAGNOSIS — F03.90 UNSPECIFIED DEMENTIA, UNSPECIFIED SEVERITY, WITHOUT BEHAVIORAL DISTURBANCE, PSYCHOTIC DISTURBANCE, MOOD DISTURBANCE, AND ANXIETY: ICD-10-CM

## 2022-04-26 DIAGNOSIS — G25.81 RESTLESS LEGS SYNDROME: ICD-10-CM

## 2022-04-26 DIAGNOSIS — G20 PARKINSON'S DISEASE: ICD-10-CM

## 2022-04-26 DIAGNOSIS — E03.9 HYPOTHYROIDISM, UNSPECIFIED: ICD-10-CM

## 2022-04-26 LAB
ALBUMIN SERPL ELPH-MCNC: 3.9 G/DL — SIGNIFICANT CHANGE UP (ref 3.3–5)
ALP SERPL-CCNC: 82 U/L — SIGNIFICANT CHANGE UP (ref 40–120)
ALT FLD-CCNC: 32 U/L — SIGNIFICANT CHANGE UP (ref 4–41)
ANION GAP SERPL CALC-SCNC: 11 MMOL/L — SIGNIFICANT CHANGE UP (ref 7–14)
ANION GAP SERPL CALC-SCNC: 12 MMOL/L — SIGNIFICANT CHANGE UP (ref 7–14)
APTT BLD: 31.1 SEC — SIGNIFICANT CHANGE UP (ref 27–36.3)
APTT BLD: 36.7 SEC — HIGH (ref 27–36.3)
AST SERPL-CCNC: 28 U/L — SIGNIFICANT CHANGE UP (ref 4–40)
BASOPHILS # BLD AUTO: 0 K/UL — SIGNIFICANT CHANGE UP (ref 0–0.2)
BASOPHILS NFR BLD AUTO: 0 % — SIGNIFICANT CHANGE UP (ref 0–2)
BILIRUB SERPL-MCNC: 1.2 MG/DL — SIGNIFICANT CHANGE UP (ref 0.2–1.2)
BLD GP AB SCN SERPL QL: NEGATIVE — SIGNIFICANT CHANGE UP
BUN SERPL-MCNC: 19 MG/DL — SIGNIFICANT CHANGE UP (ref 7–23)
BUN SERPL-MCNC: 19 MG/DL — SIGNIFICANT CHANGE UP (ref 7–23)
CALCIUM SERPL-MCNC: 8.6 MG/DL — SIGNIFICANT CHANGE UP (ref 8.4–10.5)
CALCIUM SERPL-MCNC: 8.8 MG/DL — SIGNIFICANT CHANGE UP (ref 8.4–10.5)
CHLORIDE SERPL-SCNC: 104 MMOL/L — SIGNIFICANT CHANGE UP (ref 98–107)
CHLORIDE SERPL-SCNC: 105 MMOL/L — SIGNIFICANT CHANGE UP (ref 98–107)
CO2 SERPL-SCNC: 21 MMOL/L — LOW (ref 22–31)
CO2 SERPL-SCNC: 22 MMOL/L — SIGNIFICANT CHANGE UP (ref 22–31)
CREAT SERPL-MCNC: 0.65 MG/DL — SIGNIFICANT CHANGE UP (ref 0.5–1.3)
CREAT SERPL-MCNC: 0.67 MG/DL — SIGNIFICANT CHANGE UP (ref 0.5–1.3)
EGFR: 90 ML/MIN/1.73M2 — SIGNIFICANT CHANGE UP
EGFR: 91 ML/MIN/1.73M2 — SIGNIFICANT CHANGE UP
EOSINOPHIL # BLD AUTO: 0.09 K/UL — SIGNIFICANT CHANGE UP (ref 0–0.5)
EOSINOPHIL NFR BLD AUTO: 0.9 % — SIGNIFICANT CHANGE UP (ref 0–6)
FLUAV AG NPH QL: SIGNIFICANT CHANGE UP
FLUBV AG NPH QL: SIGNIFICANT CHANGE UP
GIANT PLATELETS BLD QL SMEAR: PRESENT — SIGNIFICANT CHANGE UP
GLUCOSE SERPL-MCNC: 121 MG/DL — HIGH (ref 70–99)
GLUCOSE SERPL-MCNC: 126 MG/DL — HIGH (ref 70–99)
HCT VFR BLD CALC: 44.9 % — SIGNIFICANT CHANGE UP (ref 39–50)
HCT VFR BLD CALC: 46.5 % — SIGNIFICANT CHANGE UP (ref 39–50)
HGB BLD-MCNC: 15.3 G/DL — SIGNIFICANT CHANGE UP (ref 13–17)
HGB BLD-MCNC: 15.7 G/DL — SIGNIFICANT CHANGE UP (ref 13–17)
IANC: 9.24 K/UL — HIGH (ref 1.8–7.4)
INR BLD: 1.19 RATIO — HIGH (ref 0.88–1.16)
LYMPHOCYTES # BLD AUTO: 0.27 K/UL — LOW (ref 1–3.3)
LYMPHOCYTES # BLD AUTO: 2.6 % — LOW (ref 13–44)
MANUAL SMEAR VERIFICATION: SIGNIFICANT CHANGE UP
MCHC RBC-ENTMCNC: 31.2 PG — SIGNIFICANT CHANGE UP (ref 27–34)
MCHC RBC-ENTMCNC: 31.4 PG — SIGNIFICANT CHANGE UP (ref 27–34)
MCHC RBC-ENTMCNC: 33.8 GM/DL — SIGNIFICANT CHANGE UP (ref 32–36)
MCHC RBC-ENTMCNC: 34.1 GM/DL — SIGNIFICANT CHANGE UP (ref 32–36)
MCV RBC AUTO: 92.2 FL — SIGNIFICANT CHANGE UP (ref 80–100)
MCV RBC AUTO: 92.3 FL — SIGNIFICANT CHANGE UP (ref 80–100)
MONOCYTES # BLD AUTO: 0.28 K/UL — SIGNIFICANT CHANGE UP (ref 0–0.9)
MONOCYTES NFR BLD AUTO: 2.7 % — SIGNIFICANT CHANGE UP (ref 2–14)
NEUTROPHILS # BLD AUTO: 9.69 K/UL — HIGH (ref 1.8–7.4)
NEUTROPHILS NFR BLD AUTO: 91.1 % — HIGH (ref 43–77)
NEUTS BAND # BLD: 1.8 % — SIGNIFICANT CHANGE UP (ref 0–6)
NRBC # BLD: 0 /100 WBCS — SIGNIFICANT CHANGE UP
NRBC # FLD: 0 K/UL — SIGNIFICANT CHANGE UP
PLAT MORPH BLD: NORMAL — SIGNIFICANT CHANGE UP
PLATELET # BLD AUTO: 124 K/UL — LOW (ref 150–400)
PLATELET # BLD AUTO: 128 K/UL — LOW (ref 150–400)
PLATELET COUNT - ESTIMATE: ABNORMAL
POTASSIUM SERPL-MCNC: 3.6 MMOL/L — SIGNIFICANT CHANGE UP (ref 3.5–5.3)
POTASSIUM SERPL-MCNC: 3.6 MMOL/L — SIGNIFICANT CHANGE UP (ref 3.5–5.3)
POTASSIUM SERPL-SCNC: 3.6 MMOL/L — SIGNIFICANT CHANGE UP (ref 3.5–5.3)
POTASSIUM SERPL-SCNC: 3.6 MMOL/L — SIGNIFICANT CHANGE UP (ref 3.5–5.3)
PROT SERPL-MCNC: 6.6 G/DL — SIGNIFICANT CHANGE UP (ref 6–8.3)
PROTHROM AB SERPL-ACNC: 13.8 SEC — HIGH (ref 10.5–13.4)
RBC # BLD: 4.87 M/UL — SIGNIFICANT CHANGE UP (ref 4.2–5.8)
RBC # BLD: 5.04 M/UL — SIGNIFICANT CHANGE UP (ref 4.2–5.8)
RBC # FLD: 12.4 % — SIGNIFICANT CHANGE UP (ref 10.3–14.5)
RBC # FLD: 12.4 % — SIGNIFICANT CHANGE UP (ref 10.3–14.5)
RBC BLD AUTO: NORMAL — SIGNIFICANT CHANGE UP
RH IG SCN BLD-IMP: POSITIVE — SIGNIFICANT CHANGE UP
RSV RNA NPH QL NAA+NON-PROBE: SIGNIFICANT CHANGE UP
SARS-COV-2 RNA SPEC QL NAA+PROBE: SIGNIFICANT CHANGE UP
SODIUM SERPL-SCNC: 137 MMOL/L — SIGNIFICANT CHANGE UP (ref 135–145)
SODIUM SERPL-SCNC: 138 MMOL/L — SIGNIFICANT CHANGE UP (ref 135–145)
VARIANT LYMPHS # BLD: 0.9 % — SIGNIFICANT CHANGE UP (ref 0–6)
WBC # BLD: 10.43 K/UL — SIGNIFICANT CHANGE UP (ref 3.8–10.5)
WBC # BLD: 9.5 K/UL — SIGNIFICANT CHANGE UP (ref 3.8–10.5)
WBC # FLD AUTO: 10.43 K/UL — SIGNIFICANT CHANGE UP (ref 3.8–10.5)
WBC # FLD AUTO: 9.5 K/UL — SIGNIFICANT CHANGE UP (ref 3.8–10.5)

## 2022-04-26 PROCEDURE — 99223 1ST HOSP IP/OBS HIGH 75: CPT

## 2022-04-26 RX ORDER — CARBIDOPA AND LEVODOPA 25; 100 MG/1; MG/1
1 TABLET ORAL ONCE
Refills: 0 | Status: COMPLETED | OUTPATIENT
Start: 2022-04-26 | End: 2022-04-26

## 2022-04-26 RX ORDER — HYDROMORPHONE HYDROCHLORIDE 2 MG/ML
0.5 INJECTION INTRAMUSCULAR; INTRAVENOUS; SUBCUTANEOUS ONCE
Refills: 0 | Status: DISCONTINUED | OUTPATIENT
Start: 2022-04-26 | End: 2022-04-26

## 2022-04-26 RX ORDER — LEVOTHYROXINE SODIUM 125 MCG
50 TABLET ORAL DAILY
Refills: 0 | Status: DISCONTINUED | OUTPATIENT
Start: 2022-04-26 | End: 2022-04-29

## 2022-04-26 RX ORDER — CARBIDOPA AND LEVODOPA 25; 100 MG/1; MG/1
1 TABLET ORAL
Refills: 0 | Status: DISCONTINUED | OUTPATIENT
Start: 2022-04-27 | End: 2022-04-29

## 2022-04-26 RX ORDER — KETOROLAC TROMETHAMINE 30 MG/ML
15 SYRINGE (ML) INJECTION ONCE
Refills: 0 | Status: DISCONTINUED | OUTPATIENT
Start: 2022-04-26 | End: 2022-04-26

## 2022-04-26 RX ORDER — ACETAMINOPHEN 500 MG
1000 TABLET ORAL ONCE
Refills: 0 | Status: COMPLETED | OUTPATIENT
Start: 2022-04-26 | End: 2022-04-26

## 2022-04-26 RX ORDER — POVIDONE-IODINE 5 %
1 AEROSOL (ML) TOPICAL ONCE
Refills: 0 | Status: COMPLETED | OUTPATIENT
Start: 2022-04-26 | End: 2022-04-27

## 2022-04-26 RX ORDER — CHLORHEXIDINE GLUCONATE 213 G/1000ML
1 SOLUTION TOPICAL EVERY 12 HOURS
Refills: 0 | Status: COMPLETED | OUTPATIENT
Start: 2022-04-26 | End: 2022-04-27

## 2022-04-26 RX ORDER — CARBIDOPA AND LEVODOPA 25; 100 MG/1; MG/1
1 TABLET ORAL
Refills: 0 | Status: DISCONTINUED | OUTPATIENT
Start: 2022-04-26 | End: 2022-04-26

## 2022-04-26 RX ORDER — SODIUM CHLORIDE 9 MG/ML
1000 INJECTION, SOLUTION INTRAVENOUS
Refills: 0 | Status: DISCONTINUED | OUTPATIENT
Start: 2022-04-26 | End: 2022-04-29

## 2022-04-26 RX ORDER — HEPARIN SODIUM 5000 [USP'U]/ML
5000 INJECTION INTRAVENOUS; SUBCUTANEOUS ONCE
Refills: 0 | Status: COMPLETED | OUTPATIENT
Start: 2022-04-26 | End: 2022-04-26

## 2022-04-26 RX ORDER — ROPINIROLE 8 MG/1
5 TABLET, FILM COATED, EXTENDED RELEASE ORAL ONCE
Refills: 0 | Status: COMPLETED | OUTPATIENT
Start: 2022-04-26 | End: 2022-04-26

## 2022-04-26 RX ORDER — POLYETHYLENE GLYCOL 3350 17 G/17G
17 POWDER, FOR SOLUTION ORAL DAILY
Refills: 0 | Status: DISCONTINUED | OUTPATIENT
Start: 2022-04-26 | End: 2022-04-29

## 2022-04-26 RX ORDER — MORPHINE SULFATE 50 MG/1
2 CAPSULE, EXTENDED RELEASE ORAL ONCE
Refills: 0 | Status: DISCONTINUED | OUTPATIENT
Start: 2022-04-26 | End: 2022-04-26

## 2022-04-26 RX ORDER — CARBIDOPA AND LEVODOPA 25; 100 MG/1; MG/1
0.5 TABLET ORAL
Refills: 0 | Status: DISCONTINUED | OUTPATIENT
Start: 2022-04-27 | End: 2022-04-29

## 2022-04-26 RX ORDER — SODIUM CHLORIDE 9 MG/ML
1000 INJECTION, SOLUTION INTRAVENOUS
Refills: 0 | Status: DISCONTINUED | OUTPATIENT
Start: 2022-04-26 | End: 2022-04-26

## 2022-04-26 RX ORDER — LANOLIN ALCOHOL/MO/W.PET/CERES
6 CREAM (GRAM) TOPICAL AT BEDTIME
Refills: 0 | Status: DISCONTINUED | OUTPATIENT
Start: 2022-04-26 | End: 2022-04-29

## 2022-04-26 RX ORDER — SENNA PLUS 8.6 MG/1
2 TABLET ORAL AT BEDTIME
Refills: 0 | Status: DISCONTINUED | OUTPATIENT
Start: 2022-04-26 | End: 2022-04-29

## 2022-04-26 RX ORDER — DONEPEZIL HYDROCHLORIDE 10 MG/1
10 TABLET, FILM COATED ORAL AT BEDTIME
Refills: 0 | Status: DISCONTINUED | OUTPATIENT
Start: 2022-04-26 | End: 2022-04-29

## 2022-04-26 RX ADMIN — SODIUM CHLORIDE 100 MILLILITER(S): 9 INJECTION, SOLUTION INTRAVENOUS at 07:57

## 2022-04-26 RX ADMIN — CARBIDOPA AND LEVODOPA 0.5 TABLET(S): 25; 100 TABLET ORAL at 16:17

## 2022-04-26 RX ADMIN — ROPINIROLE 5 MILLIGRAM(S): 8 TABLET, FILM COATED, EXTENDED RELEASE ORAL at 21:09

## 2022-04-26 RX ADMIN — Medication 15 MILLIGRAM(S): at 01:58

## 2022-04-26 RX ADMIN — Medication 400 MILLIGRAM(S): at 23:38

## 2022-04-26 RX ADMIN — MORPHINE SULFATE 2 MILLIGRAM(S): 50 CAPSULE, EXTENDED RELEASE ORAL at 01:08

## 2022-04-26 RX ADMIN — DONEPEZIL HYDROCHLORIDE 10 MILLIGRAM(S): 10 TABLET, FILM COATED ORAL at 02:30

## 2022-04-26 RX ADMIN — CARBIDOPA AND LEVODOPA 1 TABLET(S): 25; 100 TABLET ORAL at 02:30

## 2022-04-26 RX ADMIN — Medication 50 MICROGRAM(S): at 09:15

## 2022-04-26 RX ADMIN — DONEPEZIL HYDROCHLORIDE 10 MILLIGRAM(S): 10 TABLET, FILM COATED ORAL at 21:09

## 2022-04-26 RX ADMIN — CARBIDOPA AND LEVODOPA 1 TABLET(S): 25; 100 TABLET ORAL at 10:54

## 2022-04-26 RX ADMIN — Medication 50 MICROGRAM(S): at 02:30

## 2022-04-26 RX ADMIN — HYDROMORPHONE HYDROCHLORIDE 0.5 MILLIGRAM(S): 2 INJECTION INTRAMUSCULAR; INTRAVENOUS; SUBCUTANEOUS at 09:15

## 2022-04-26 RX ADMIN — SODIUM CHLORIDE 100 MILLILITER(S): 9 INJECTION, SOLUTION INTRAVENOUS at 09:20

## 2022-04-26 RX ADMIN — HEPARIN SODIUM 5000 UNIT(S): 5000 INJECTION INTRAVENOUS; SUBCUTANEOUS at 16:15

## 2022-04-26 RX ADMIN — Medication 400 MILLIGRAM(S): at 02:30

## 2022-04-26 NOTE — PATIENT PROFILE ADULT - FUNCTIONAL ASSESSMENT - BASIC MOBILITY SECTION LABEL
History  Chief Complaint   Patient presents with    Urinary Retention     Pt  states urinary retention since 9am during a 2 hour drive  Hx of enlarged prostate but states he has never had this much difficulty attempting to urinate  Pt  also feels constipated  80-year-old male presents with difficulty passing urine  He has history of benign prostatic hypertrophy has urologist in [facility] he is not from this area  Denies any dysuria urgency frequency no nausea vomiting back pain flank pain  Does have suprapubic discomfort  Symptoms ongoing for couple of hours  History provided by:  Patient   used: No        Prior to Admission Medications   Prescriptions Last Dose Informant Patient Reported? Taking?   allopurinol (ZYLOPRIM) 100 mg tablet 3/2/2021 at 0730  Yes Yes   Sig: Take 100 mg by mouth daily   amLODIPine-atorvastatin (CADUET) 10-10 MG per tablet 3/2/2021 at 0730  Yes Yes   Sig: Take 1 tablet by mouth daily      Facility-Administered Medications: None       Past Medical History:   Diagnosis Date    Enlarged prostate     Hypertension     Kidney stones        Past Surgical History:   Procedure Laterality Date    APPENDECTOMY         History reviewed  No pertinent family history  I have reviewed and agree with the history as documented  E-Cigarette/Vaping     E-Cigarette/Vaping Substances     Social History     Tobacco Use    Smoking status: Former Smoker     Types: Cigarettes    Smokeless tobacco: Never Used   Substance Use Topics    Alcohol use: Yes     Frequency: 4 or more times a week     Drinks per session: 1 or 2    Drug use: Not Currently       Review of Systems   Constitutional: Negative  HENT: Negative  Eyes: Negative  Respiratory: Negative  Cardiovascular: Negative  Gastrointestinal: Negative  Endocrine: Negative  Genitourinary: Positive for difficulty urinating  Musculoskeletal: Negative  Skin: Negative      Allergic/Immunologic: Negative  Neurological: Negative  Hematological: Negative  Psychiatric/Behavioral: Negative  All other systems reviewed and are negative  Physical Exam  Physical Exam  Vitals signs and nursing note reviewed  Constitutional:       Appearance: He is well-developed  HENT:      Head: Normocephalic and atraumatic  Eyes:      Pupils: Pupils are equal, round, and reactive to light  Neck:      Musculoskeletal: Normal range of motion and neck supple  Cardiovascular:      Rate and Rhythm: Normal rate and regular rhythm  Pulmonary:      Effort: Pulmonary effort is normal       Breath sounds: Normal breath sounds  Abdominal:      General: Bowel sounds are normal       Palpations: Abdomen is soft  Genitourinary:     Rectum: Normal       Comments: No fecal impaction noted  Musculoskeletal: Normal range of motion  Skin:     General: Skin is warm and dry  Neurological:      Mental Status: He is alert and oriented to person, place, and time           Vital Signs  ED Triage Vitals [03/02/21 1543]   Temperature Pulse Respirations Blood Pressure SpO2   97 6 °F (36 4 °C) (!) 112 18 (!) 184/105 99 %      Temp Source Heart Rate Source Patient Position - Orthostatic VS BP Location FiO2 (%)   Tympanic Monitor Sitting Right arm --      Pain Score       --           Vitals:    03/02/21 1543 03/02/21 1658 03/02/21 1700   BP: (!) 184/105 158/77 158/77   Pulse: (!) 112 90 94   Patient Position - Orthostatic VS: Sitting Lying Lying         Visual Acuity      ED Medications  Medications - No data to display    Diagnostic Studies  Results Reviewed     Procedure Component Value Units Date/Time    Urine Microscopic [[id number]]  (Abnormal) Collected: 03/02/21 1613    Lab Status: Final result Specimen: Urine, Clean Catch Updated: 03/02/21 1630     RBC, UA 30-50 /hpf      WBC, UA 1-2 /hpf      Epithelial Cells None Seen /hpf      Bacteria, UA None Seen /hpf     UA (URINE) with reflex to Scope [[id number]]  (Abnormal) Collected: 03/02/21 1613    Lab Status: Final result Specimen: Urine, Clean Catch Updated: 03/02/21 1618     Color, UA Yellow     Clarity, UA Clear     Specific Gravity, UA 1 010     pH, UA 6 0     Leukocytes, UA Negative     Nitrite, UA Negative     Protein, UA Negative mg/dl      Glucose, UA Negative mg/dl      Ketones, UA Trace mg/dl      Urobilinogen, UA 0 2 E U /dl      Bilirubin, UA Negative     Blood, UA Moderate    Basic metabolic panel [[id number]] Collected: 03/02/21 1557    Lab Status: Final result Specimen: Blood from Arm, Left Updated: 03/02/21 1613     Sodium 139 mmol/L      Potassium 3 8 mmol/L      Chloride 104 mmol/L      CO2 25 mmol/L      ANION GAP 10 mmol/L      BUN 16 mg/dL      Creatinine 0 87 mg/dL      Glucose 127 mg/dL      Calcium 9 8 mg/dL      eGFR 81 ml/min/1 73sq m     Narrative:      [facility] guidelines for Chronic Kidney Disease (CKD):     Stage 1 with normal or high GFR (GFR > 90 mL/min/1 73 square meters)    Stage 2 Mild CKD (GFR = 60-89 mL/min/1 73 square meters)    Stage 3A Moderate CKD (GFR = 45-59 mL/min/1 73 square meters)    Stage 3B Moderate CKD (GFR = 30-44 mL/min/1 73 square meters)    Stage 4 Severe CKD (GFR = 15-29 mL/min/1 73 square meters)    Stage 5 End Stage CKD (GFR <15 mL/min/1 73 square meters)  Note: GFR calculation is accurate only with a steady state creatinine    CBC and differential [[id number]]  (Abnormal) Collected: 03/02/21 1557    Lab Status: Final result Specimen: Blood from Arm, Left Updated: 03/02/21 1604     WBC 9 68 Thousand/uL      RBC 4 29 Million/uL      Hemoglobin 13 9 g/dL      Hematocrit 41 7 %      MCV 97 fL      MCH 32 4 pg      MCHC 33 3 g/dL      RDW 13 2 %      MPV 9 5 fL      Platelets 938 Thousands/uL      Neutrophils Relative 85 %      Lymphocytes Relative 10 %      Monocytes Relative 5 %      Eosinophils Relative 0 %      Basophils Relative 0 %      Neutrophils Absolute 8 19 Thousands/µL      Lymphocytes Absolute 0 97 Thousands/µL      Monocytes Absolute 0 49 Thousand/µL      Eosinophils Absolute 0 02 Thousand/µL      Basophils Absolute 0 01 Thousands/µL                  No orders to display              Procedures  Procedures         ED Course                                           MDM  Number of Diagnoses or Management Options  Urinary retention:   Diagnosis management comments: Bladder scan showed more than 700 cc of urine  Wolf inserted  Patient will follow-up with his own urologist        Amount and/or Complexity of Data Reviewed  Clinical lab tests: ordered and reviewed  Tests in the medicine section of CPT®: ordered and reviewed    Patient Progress  Patient progress: stable      Disposition  Final diagnoses:   Urinary retention     Time reflects when diagnosis was documented in both MDM as applicable and the Disposition within this note     Time User Action Codes Description Comment    3/2/2021  5:12 PM Roma Baxter Add [R33 9] Urinary retention       ED Disposition     ED Disposition Condition Date/Time Comment    Discharge Stable Tu Mar 2, 2021  5:12 PM Geyl Law discharge to home/self care  Follow-up Information     Follow up With Specialties Details Why Contact Info Additional Information    [address] [facility] Emergency Department Emergency Medicine  If symptoms worsen [address] [facility] [id number] [phone] [address] Emergency Department, [place], [place], [address]          Discharge Medication List as of 3/2/2021  5:12 PM      CONTINUE these medications which have NOT CHANGED    Details   allopurinol (ZYLOPRIM) 100 mg tablet Take 100 mg by mouth daily, Historical Med      amLODIPine-atorvastatin (CADUET) 10-10 MG per tablet Take 1 tablet by mouth daily, Historical Med           No discharge procedures on file      PDMP Review     None          ED Provider  Electronically Signed by           Linda Spear DO  03/03/21 8903 .

## 2022-04-26 NOTE — H&P ADULT - HISTORY OF PRESENT ILLNESS
88yMale uses walker at baseline c/o R hip pain s/p mechanical fall. Patient denies head hit or LOC. Patient denies numbness or tingling in the RLLE. Patient denies any other injuries.    PMH:  Parkinsons    Dementia      PSH:  No significant past surgical history      AH:    Meds: See med rec    T(C): 36.7 (04-26-22 @ 01:27)  HR: 71 (04-26-22 @ 01:27)  BP: 120/71 (04-26-22 @ 01:27)  RR: 16 (04-26-22 @ 01:27)  SpO2: 98% (04-26-22 @ 01:27)  Wt(kg): -    PE  Gen: Laying in bed, alert and oriented, NAD  Resp: Unlabored breathing  RLE: Skin intact, no ecchymosis,   SILT DP/SP/ Blank/Saph/Post Tib  +EHL/FHL/TA/Gastroc,   Knee/ankle painless ROM,   hip ROM limited 2/2 pain,   DP+,   soft compartments, no calf ttp,   +log roll.    Secondary:  No TTP over bony landmarks, SILT BL, ROM intact BL, distal pulses palpable.    Imaging:  XR demonstrating with Right Femoral Neck Fracture    88yMale with Right Femoral Neck  Fracture. Plan for OR with Dr. Gonzalez 4/26    - Pain control  - IS  - Continue home medications  - NPO/IVF  - CBC/BMP/Coags/T+S x2  - EKG/CXR  -  Medical clearance prior to planned procedure  - Plan for OR

## 2022-04-26 NOTE — CONSULT NOTE ADULT - ASSESSMENT
87yo Male w/ pmhx of atypical parkinsons disease, mild dementia, hypothyroid, BPH, restless leg syndrome and possible mild emphysema (nonsmoker, per daughter) presents to the ER for R hip pain after a mechanical fall at home found to have R femoral neck fracture, to be admitted for surgical management.

## 2022-04-26 NOTE — PATIENT PROFILE ADULT - FALL HARM RISK - HARM RISK INTERVENTIONS

## 2022-04-26 NOTE — CONSULT NOTE ADULT - PROBLEM SELECTOR RECOMMENDATION 9
s/p mechanical fall at home. Denies any chest pain or sob. Can ambulate with a walker with no CP or SOB, but requires help with most ADLs due to parkinsons.  -RCRI of 0, EKG normal sinus rhythm  -no further cardiac workup prior to surgery  -Of note pt was put on 1L NC in the ER due to low 90s sats while sleeping, on my interview and exam while speaking, pt was satting 95% on room air. per daughter pt previously had imaging that possibly showed ?emphysema, but never diagnosed and pt never smoked. Should have outpatient follow up. s/p mechanical fall at home. Denies any chest pain or sob. Can ambulate with a walker with no CP or SOB, but requires help with most ADLs due to parkinsons.  -RCRI of 0, EKG normal sinus rhythm  -no further cardiac workup prior to surgery, medically optimized for surgery  -Of note pt was put on 1L NC in the ER due to low 90s sats while sleeping, on my interview and exam while speaking, pt was satting 95% on room air. per daughter pt previously had imaging that possibly showed ?emphysema, but never diagnosed and pt never smoked. Should have outpatient follow up.

## 2022-04-26 NOTE — CONSULT NOTE ADULT - SUBJECTIVE AND OBJECTIVE BOX
HPI:  87yo Male w/ pmhx of atypical parkinsons disease, mild dementia, hypothyroid, BPH, restless leg syndrome and possible mild emphysema (nonsmoker, per daughter) presents to the ER for R hip pain after a mechanical fall at home which was witnessed by his wife. Collateral obtained from pt's daughter Citlali. Pt reports he misstep this AM and fell forward landing on his R side and his R hip began to hurt. He denies any loss of consciousness nor any head trauma. At baseline he is slightly unsteady and ambulates with a cane or walker and can do so without any shortness of breath nor chest pain. He can do some ADLs but requires an aide to help out due to the unsteadiness. He has no hx of CHF, CAD, CVA, DM2 or CKD. He denies fever, cough, chest pain, palpitations, shortness of breath, abd pain, dysuria or diarrhea. Per daughter he is a nonsmoker but reportedly has had outpatient imaging in the past which showed ?emphysema but never diagnosed. Pt was found in the ER to have a R femoral neck fracture.     PAST MEDICAL & SURGICAL HISTORY:  Parkinsons    Dementia    hypothyroid    BPH    Restless leg syndrome    Prostatectomy         Review of Systems:   CONSTITUTIONAL: No fever, or fatigue  EYES: No eye pain, visual disturbances, or discharge  ENMT:  No difficulty hearing, tinnitus, vertigo; No sinus or throat pain  NECK: No pain or stiffness  RESPIRATORY: No cough, wheezing, chills or hemoptysis; No shortness of breath  CARDIOVASCULAR: No chest pain, palpitations, dizziness, or leg swelling  GASTROINTESTINAL: No abdominal or epigastric pain. No nausea, vomiting, or hematemesis; No diarrhea or constipation. No melena or hematochezia.  GENITOURINARY: No dysuria or frequendcy  NEUROLOGICAL: +chronic unsteadiness, No headaches, memory loss, loss of strength or numbness  SKIN: No itching, burning, rashes, or lesions   MUSCULOSKELETAL: +R hip pain  HEME/LYMPH: No easy bruising, or bleeding gums  ALLERY AND IMMUNOLOGIC: No hives or eczema    Allergies    No Known Allergies    Intolerances        Social History: nonsmoker, occasional alcohol use    FAMILY HISTORY: no hx of CAD      MEDICATIONS  (STANDING):  acetaminophen   IVPB .. 1000 milliGRAM(s) IV Intermittent Once  carbidopa/levodopa  25/100 1 Tablet(s) Oral Once  donepezil 10 milliGRAM(s) Oral at bedtime  levothyroxine 50 MICROGram(s) Oral daily  rOPINIRole 5 milliGRAM(s) Oral Once    MEDICATIONS  (PRN):        CAPILLARY BLOOD GLUCOSE        I&O's Summary      PHYSICAL EXAM:  GENERAL: NAD, well-developed  HEAD:  Atraumatic, Normocephalic  EYES: EOMI, PERRLA, conjunctiva and sclera clear  NECK: Supple, No JVD  CHEST/LUNG: Clear to auscultation bilaterally; No wheeze  HEART: Regular rate and rhythm; No murmurs, rubs, or gallops  ABDOMEN: Soft, Nontender, Nondistended; Bowel sounds present  EXTREMITIES: no LE edema b/l, +R hip pain  PSYCH: AAOx3  NEUROLOGY: RLE strength limited due to pain  SKIN: No rashes or lesions    LABS:                        15.7   10.43 )-----------( 128      ( 26 Apr 2022 01:38 )             46.5     04-26    138  |  105  |  19  ----------------------------<  121<H>  3.6   |  21<L>  |  0.67    Ca    8.8      26 Apr 2022 01:38    TPro  6.6  /  Alb  3.9  /  TBili  1.2  /  DBili  x   /  AST  28  /  ALT  32  /  AlkPhos  82  04-26    PTT - ( 26 Apr 2022 01:38 )  PTT:36.7 sec    CTH: IMPRESSION:    No acute intracranial hemorrhage, mass effect, or acute osseous fracture.    CT pelvis: FINDINGS:    Stomach and bowel: Visualized bowel loops are unremarkable.  Reproductive: Prostatomegaly. Small calcifications of the prostate gland.  Bones/joints: Acute right femoral neck fracture.  Soft tissues: Unremarkable.    IMPRESSION:  Acute right femoral neck fracture.      EKG: normal sinus rhythm         HPI:  87yo Male w/ pmhx of atypical parkinsons disease, mild dementia, hypothyroid, BPH, restless leg syndrome and possible mild emphysema (nonsmoker, per daughter) presents to the ER for R hip pain after a mechanical fall at home which was witnessed by his wife. Collateral obtained from pt's daughter Citlali. Pt reports he misstep this AM and fell forward landing on his R side and his R hip began to hurt. He denies any loss of consciousness nor any head trauma. At baseline he is slightly unsteady and ambulates with a cane or walker and can do so without any shortness of breath nor chest pain. He can do some ADLs but requires an aide to help out due to the unsteadiness. He has no hx of CHF, CAD, CVA, DM2 or CKD. He denies fever, cough, chest pain, palpitations, shortness of breath, abd pain, dysuria or diarrhea. Per daughter he is a nonsmoker but reportedly has had outpatient imaging in the past which showed ?emphysema but never diagnosed. Pt was found in the ER to have a R femoral neck fracture.     PAST MEDICAL & SURGICAL HISTORY:  Parkinsons    Dementia    hypothyroid    BPH    Restless leg syndrome    Prostatectomy         Review of Systems:   CONSTITUTIONAL: No fever, or fatigue  EYES: No eye pain, visual disturbances, or discharge  ENMT:  No difficulty hearing, tinnitus, vertigo; No sinus or throat pain  NECK: No pain or stiffness  RESPIRATORY: No cough, wheezing, chills or hemoptysis; No shortness of breath  CARDIOVASCULAR: No chest pain, palpitations, dizziness, or leg swelling  GASTROINTESTINAL: No abdominal or epigastric pain. No nausea, vomiting, or hematemesis; No diarrhea or constipation. No melena or hematochezia.  GENITOURINARY: No dysuria or frequendcy  NEUROLOGICAL: +chronic unsteadiness, No headaches, memory loss, loss of strength or numbness  SKIN: No itching, burning, rashes, or lesions   MUSCULOSKELETAL: +R hip pain  HEME/LYMPH: No easy bruising, or bleeding gums  ALLERY AND IMMUNOLOGIC: No hives or eczema    Allergies    No Known Allergies    Intolerances        Social History: nonsmoker, occasional alcohol use    FAMILY HISTORY: no hx of CAD      MEDICATIONS  (STANDING):  acetaminophen   IVPB .. 1000 milliGRAM(s) IV Intermittent Once  carbidopa/levodopa  25/100 1 Tablet(s) Oral Once  donepezil 10 milliGRAM(s) Oral at bedtime  levothyroxine 50 MICROGram(s) Oral daily  rOPINIRole 5 milliGRAM(s) Oral Once    MEDICATIONS  (PRN):        CAPILLARY BLOOD GLUCOSE        I&O's Summary      PHYSICAL EXAM:  GENERAL: NAD, well-developed  HEAD:  Atraumatic, Normocephalic  EYES: EOMI, PERRLA, conjunctiva and sclera clear  NECK: Supple, No JVD  CHEST/LUNG: Clear to auscultation bilaterally; No wheeze  HEART: Regular rate and rhythm; No murmurs, rubs, or gallops  ABDOMEN: Soft, Nontender, Nondistended; Bowel sounds present  EXTREMITIES: no LE edema b/l, +R hip pain  PSYCH: AAOx3  NEUROLOGY: RLE strength limited due to pain  SKIN: No rashes or lesions    LABS:                        15.7   10.43 )-----------( 128      ( 26 Apr 2022 01:38 )             46.5     04-26    138  |  105  |  19  ----------------------------<  121<H>  3.6   |  21<L>  |  0.67    Ca    8.8      26 Apr 2022 01:38    TPro  6.6  /  Alb  3.9  /  TBili  1.2  /  DBili  x   /  AST  28  /  ALT  32  /  AlkPhos  82  04-26    PTT - ( 26 Apr 2022 01:38 )  PTT:36.7 sec    CTH: IMPRESSION:    No acute intracranial hemorrhage, mass effect, or acute osseous fracture.    CT pelvis: FINDINGS:    Stomach and bowel: Visualized bowel loops are unremarkable.  Reproductive: Prostatomegaly. Small calcifications of the prostate gland.  Bones/joints: Acute right femoral neck fracture.  Soft tissues: Unremarkable.    IMPRESSION:  Acute right femoral neck fracture.      EKG: normal sinus rhythm    CXR: FINDINGS:  Bibasilar linear atelectasis. No focal consolidation.  There is no pleural effusion or pneumothorax.  The heart size is normal.  The visualized osseous structures demonstrate no acute pathology.    IMPRESSION:  No focal consolidation.

## 2022-04-26 NOTE — ED ADULT NURSE NOTE - CHIEF COMPLAINT QUOTE
pt coming from home.  pt had mechanical fall today at 10am.  pt c/o right hip pain.  denies blood thinners and loc

## 2022-04-26 NOTE — ED ADULT NURSE NOTE - OBJECTIVE STATEMENT
Pt. presented to room 10. Pt. A&Ox3 ambulatory w/ a cane at baseline. Pt. had mechanical fall at 11AM on 4/25 was able to get up w/ help of wife took tylenols for pain w/ relief however throughout the day the pain got worse. Pt. endorses right hip pain. pt. unable to fully straighten leg d/t pain. Pt. has b/l palpable PD pulses. vitally stable. LFA 18G labs sent. medicated per order.

## 2022-04-26 NOTE — H&P ADULT - ATTENDING COMMENTS
Associated images, notes, and labs were reviewed. The patient was seen and examined. Risks and benefits of operative versus nonoperative management were discussed with the patient. The patient showed a good understanding of the injury and the treatment options. They would like to move forward with operative management of the right minimally displaced femoral neck fracture.

## 2022-04-27 LAB
ANION GAP SERPL CALC-SCNC: 10 MMOL/L — SIGNIFICANT CHANGE UP (ref 7–14)
ANION GAP SERPL CALC-SCNC: 9 MMOL/L — SIGNIFICANT CHANGE UP (ref 7–14)
APTT BLD: 33.1 SEC — SIGNIFICANT CHANGE UP (ref 27–36.3)
BUN SERPL-MCNC: 14 MG/DL — SIGNIFICANT CHANGE UP (ref 7–23)
BUN SERPL-MCNC: 16 MG/DL — SIGNIFICANT CHANGE UP (ref 7–23)
CALCIUM SERPL-MCNC: 7.9 MG/DL — LOW (ref 8.4–10.5)
CALCIUM SERPL-MCNC: 8.2 MG/DL — LOW (ref 8.4–10.5)
CHLORIDE SERPL-SCNC: 110 MMOL/L — HIGH (ref 98–107)
CHLORIDE SERPL-SCNC: 110 MMOL/L — HIGH (ref 98–107)
CO2 SERPL-SCNC: 20 MMOL/L — LOW (ref 22–31)
CO2 SERPL-SCNC: 22 MMOL/L — SIGNIFICANT CHANGE UP (ref 22–31)
CREAT SERPL-MCNC: 0.59 MG/DL — SIGNIFICANT CHANGE UP (ref 0.5–1.3)
CREAT SERPL-MCNC: 0.6 MG/DL — SIGNIFICANT CHANGE UP (ref 0.5–1.3)
EGFR: 93 ML/MIN/1.73M2 — SIGNIFICANT CHANGE UP
EGFR: 93 ML/MIN/1.73M2 — SIGNIFICANT CHANGE UP
GLUCOSE SERPL-MCNC: 122 MG/DL — HIGH (ref 70–99)
GLUCOSE SERPL-MCNC: 142 MG/DL — HIGH (ref 70–99)
HCT VFR BLD CALC: 40.8 % — SIGNIFICANT CHANGE UP (ref 39–50)
HCT VFR BLD CALC: 40.9 % — SIGNIFICANT CHANGE UP (ref 39–50)
HGB BLD-MCNC: 13.7 G/DL — SIGNIFICANT CHANGE UP (ref 13–17)
HGB BLD-MCNC: 13.8 G/DL — SIGNIFICANT CHANGE UP (ref 13–17)
INR BLD: 1.11 RATIO — SIGNIFICANT CHANGE UP (ref 0.88–1.16)
MCHC RBC-ENTMCNC: 31.1 PG — SIGNIFICANT CHANGE UP (ref 27–34)
MCHC RBC-ENTMCNC: 31.3 PG — SIGNIFICANT CHANGE UP (ref 27–34)
MCHC RBC-ENTMCNC: 33.6 GM/DL — SIGNIFICANT CHANGE UP (ref 32–36)
MCHC RBC-ENTMCNC: 33.7 GM/DL — SIGNIFICANT CHANGE UP (ref 32–36)
MCV RBC AUTO: 92.1 FL — SIGNIFICANT CHANGE UP (ref 80–100)
MCV RBC AUTO: 93.2 FL — SIGNIFICANT CHANGE UP (ref 80–100)
NRBC # BLD: 0 /100 WBCS — SIGNIFICANT CHANGE UP
NRBC # BLD: 0 /100 WBCS — SIGNIFICANT CHANGE UP
NRBC # FLD: 0 K/UL — SIGNIFICANT CHANGE UP
NRBC # FLD: 0 K/UL — SIGNIFICANT CHANGE UP
PLATELET # BLD AUTO: 108 K/UL — LOW (ref 150–400)
PLATELET # BLD AUTO: 97 K/UL — LOW (ref 150–400)
POTASSIUM SERPL-MCNC: 3.3 MMOL/L — LOW (ref 3.5–5.3)
POTASSIUM SERPL-MCNC: 3.4 MMOL/L — LOW (ref 3.5–5.3)
POTASSIUM SERPL-SCNC: 3.3 MMOL/L — LOW (ref 3.5–5.3)
POTASSIUM SERPL-SCNC: 3.4 MMOL/L — LOW (ref 3.5–5.3)
PROTHROM AB SERPL-ACNC: 12.9 SEC — SIGNIFICANT CHANGE UP (ref 10.5–13.4)
RBC # BLD: 4.38 M/UL — SIGNIFICANT CHANGE UP (ref 4.2–5.8)
RBC # BLD: 4.44 M/UL — SIGNIFICANT CHANGE UP (ref 4.2–5.8)
RBC # FLD: 12.3 % — SIGNIFICANT CHANGE UP (ref 10.3–14.5)
RBC # FLD: 12.4 % — SIGNIFICANT CHANGE UP (ref 10.3–14.5)
SODIUM SERPL-SCNC: 139 MMOL/L — SIGNIFICANT CHANGE UP (ref 135–145)
SODIUM SERPL-SCNC: 142 MMOL/L — SIGNIFICANT CHANGE UP (ref 135–145)
WBC # BLD: 6.68 K/UL — SIGNIFICANT CHANGE UP (ref 3.8–10.5)
WBC # BLD: 7.38 K/UL — SIGNIFICANT CHANGE UP (ref 3.8–10.5)
WBC # FLD AUTO: 6.68 K/UL — SIGNIFICANT CHANGE UP (ref 3.8–10.5)
WBC # FLD AUTO: 7.38 K/UL — SIGNIFICANT CHANGE UP (ref 3.8–10.5)

## 2022-04-27 PROCEDURE — 27235 TREAT THIGH FRACTURE: CPT | Mod: RT

## 2022-04-27 PROCEDURE — 99233 SBSQ HOSP IP/OBS HIGH 50: CPT

## 2022-04-27 DEVICE — SCREW CANN ASNIS LLL 6.5X70MM: Type: IMPLANTABLE DEVICE | Site: RIGHT | Status: FUNCTIONAL

## 2022-04-27 DEVICE — SCREW CANN ASNIS LLL 6.5X80MM: Type: IMPLANTABLE DEVICE | Site: RIGHT | Status: FUNCTIONAL

## 2022-04-27 DEVICE — SCREW 100X6.5: Type: IMPLANTABLE DEVICE | Site: RIGHT | Status: FUNCTIONAL

## 2022-04-27 DEVICE — GUIDE WIRE K-WIRE: Type: IMPLANTABLE DEVICE | Site: RIGHT | Status: FUNCTIONAL

## 2022-04-27 RX ORDER — ACETAMINOPHEN 500 MG
1000 TABLET ORAL ONCE
Refills: 0 | Status: COMPLETED | OUTPATIENT
Start: 2022-04-28 | End: 2022-04-28

## 2022-04-27 RX ORDER — OXYCODONE HYDROCHLORIDE 5 MG/1
5 TABLET ORAL EVERY 4 HOURS
Refills: 0 | Status: DISCONTINUED | OUTPATIENT
Start: 2022-04-27 | End: 2022-04-29

## 2022-04-27 RX ORDER — ENOXAPARIN SODIUM 100 MG/ML
40 INJECTION SUBCUTANEOUS EVERY 24 HOURS
Refills: 0 | Status: DISCONTINUED | OUTPATIENT
Start: 2022-04-27 | End: 2022-04-27

## 2022-04-27 RX ORDER — ACETAMINOPHEN 500 MG
1000 TABLET ORAL ONCE
Refills: 0 | Status: COMPLETED | OUTPATIENT
Start: 2022-04-27 | End: 2022-04-27

## 2022-04-27 RX ORDER — POTASSIUM CHLORIDE 20 MEQ
20 PACKET (EA) ORAL ONCE
Refills: 0 | Status: COMPLETED | OUTPATIENT
Start: 2022-04-27 | End: 2022-04-27

## 2022-04-27 RX ORDER — CHLORHEXIDINE GLUCONATE 213 G/1000ML
1 SOLUTION TOPICAL ONCE
Refills: 0 | Status: DISCONTINUED | OUTPATIENT
Start: 2022-04-27 | End: 2022-04-29

## 2022-04-27 RX ORDER — ENOXAPARIN SODIUM 100 MG/ML
40 INJECTION SUBCUTANEOUS EVERY 24 HOURS
Refills: 0 | Status: DISCONTINUED | OUTPATIENT
Start: 2022-04-28 | End: 2022-04-29

## 2022-04-27 RX ORDER — SODIUM CHLORIDE 9 MG/ML
1000 INJECTION INTRAMUSCULAR; INTRAVENOUS; SUBCUTANEOUS
Refills: 0 | Status: DISCONTINUED | OUTPATIENT
Start: 2022-04-27 | End: 2022-04-29

## 2022-04-27 RX ORDER — CEFAZOLIN SODIUM 1 G
2000 VIAL (EA) INJECTION EVERY 8 HOURS
Refills: 0 | Status: COMPLETED | OUTPATIENT
Start: 2022-04-27 | End: 2022-04-28

## 2022-04-27 RX ORDER — CARBIDOPA AND LEVODOPA 25; 100 MG/1; MG/1
0.5 TABLET ORAL ONCE
Refills: 0 | Status: COMPLETED | OUTPATIENT
Start: 2022-04-27 | End: 2022-04-27

## 2022-04-27 RX ORDER — OXYCODONE HYDROCHLORIDE 5 MG/1
2.5 TABLET ORAL EVERY 4 HOURS
Refills: 0 | Status: DISCONTINUED | OUTPATIENT
Start: 2022-04-27 | End: 2022-04-29

## 2022-04-27 RX ORDER — ACETAMINOPHEN 500 MG
1000 TABLET ORAL ONCE
Refills: 0 | Status: COMPLETED | OUTPATIENT
Start: 2022-04-28 | End: 2022-04-27

## 2022-04-27 RX ORDER — ACETAMINOPHEN 500 MG
1000 TABLET ORAL ONCE
Refills: 0 | Status: COMPLETED | OUTPATIENT
Start: 2022-04-29 | End: 2022-04-29

## 2022-04-27 RX ADMIN — CARBIDOPA AND LEVODOPA 1 TABLET(S): 25; 100 TABLET ORAL at 05:25

## 2022-04-27 RX ADMIN — Medication 6 MILLIGRAM(S): at 00:06

## 2022-04-27 RX ADMIN — Medication 6 MILLIGRAM(S): at 21:54

## 2022-04-27 RX ADMIN — POLYETHYLENE GLYCOL 3350 17 GRAM(S): 17 POWDER, FOR SOLUTION ORAL at 15:32

## 2022-04-27 RX ADMIN — Medication 1 APPLICATION(S): at 05:26

## 2022-04-27 RX ADMIN — CARBIDOPA AND LEVODOPA 0.5 TABLET(S): 25; 100 TABLET ORAL at 15:32

## 2022-04-27 RX ADMIN — SENNA PLUS 2 TABLET(S): 8.6 TABLET ORAL at 21:55

## 2022-04-27 RX ADMIN — CHLORHEXIDINE GLUCONATE 1 APPLICATION(S): 213 SOLUTION TOPICAL at 05:25

## 2022-04-27 RX ADMIN — Medication 400 MILLIGRAM(S): at 15:58

## 2022-04-27 RX ADMIN — DONEPEZIL HYDROCHLORIDE 10 MILLIGRAM(S): 10 TABLET, FILM COATED ORAL at 21:54

## 2022-04-27 RX ADMIN — Medication 1000 MILLIGRAM(S): at 16:10

## 2022-04-27 RX ADMIN — SODIUM CHLORIDE 125 MILLILITER(S): 9 INJECTION INTRAMUSCULAR; INTRAVENOUS; SUBCUTANEOUS at 21:55

## 2022-04-27 RX ADMIN — Medication 400 MILLIGRAM(S): at 21:54

## 2022-04-27 RX ADMIN — Medication 100 MILLIGRAM(S): at 16:00

## 2022-04-27 RX ADMIN — Medication 20 MILLIEQUIVALENT(S): at 18:32

## 2022-04-27 NOTE — OCCUPATIONAL THERAPY INITIAL EVALUATION ADULT - PERTINENT HX OF CURRENT PROBLEM, REHAB EVAL
Patient is an 87 y/o male admitted with right femur fx and is now s/p closed reduction percutaneous pinning right femur

## 2022-04-27 NOTE — PHYSICAL THERAPY INITIAL EVALUATION ADULT - RANGE OF MOTION EXAMINATION, REHAB EVAL
Right hip flexion 0-90, Right knee/ankle WFL/bilateral upper extremity ROM was WFL (within functional limits)/Left LE ROM was WFL (within functional limits)

## 2022-04-27 NOTE — PROGRESS NOTE ADULT - PROBLEM SELECTOR PLAN 1
s/p closed reduction percutaneous pinning of right femur on 4/27/22.   management as per ortho  pain control Tylenol/oxy IR prn  c/w IVFs  encouraged incentive spirometry  wean off oxygen

## 2022-04-27 NOTE — PHYSICAL THERAPY INITIAL EVALUATION ADULT - PERTINENT HX OF CURRENT PROBLEM, REHAB EVAL
Pt is a 88 year old male presenting s/p mechanical fall and right hip pain. Found to have right femoral neck fracture. Pt POD#0 s/p closed reduction percutaneous pinning right femur. PMH listed below.

## 2022-04-27 NOTE — PHYSICAL THERAPY INITIAL EVALUATION ADULT - PATIENT PROFILE REVIEW, REHAB EVAL
PT evaluate and treat orders received: no formal activity orders. Consult with SYDNEY BAER, pt may participate in PT evaluation./yes

## 2022-04-27 NOTE — PROGRESS NOTE ADULT - SUBJECTIVE AND OBJECTIVE BOX
ORTHO PREOP NOTE     Diagnosis:  R FnFx    Surgeon: Lisa RICARDO    Procedure:  right hip hemiarthroplasty       Labs:                          13.8   7.38  )-----------( 108      ( 27 Apr 2022 05:23 )             40.9         04-27    139  |  110<H>  |  16  ----------------------------<  142<H>  3.4<L>   |  20<L>  |  0.60    Ca    8.2<L>      27 Apr 2022 05:21    TPro  6.6  /  Alb  3.9  /  TBili  1.2  /  DBili  x   /  AST  28  /  ALT  32  /  AlkPhos  82  04-26        PT/INR - ( 27 Apr 2022 05:23 )   PT: 12.9 sec;   INR: 1.11 ratio         PTT - ( 27 Apr 2022 05:23 )  PTT:33.1 sec            CXR: done    Type and cross x 2 yes    Medical  Clearance: in chart     Consent: in chart     NPO: yes

## 2022-04-27 NOTE — PROGRESS NOTE ADULT - SUBJECTIVE AND OBJECTIVE BOX
Orthopedics Post-Op Check:  Patient was seen and examined at bedside. Denies CP/SOB/Dizziness, N/V/D, HA. Pain is controlled.     Vital Signs Last 24 Hrs  T(C): 36.6 (27 Apr 2022 12:10), Max: 36.9 (27 Apr 2022 02:41)  T(F): 97.8 (27 Apr 2022 12:10), Max: 98.4 (27 Apr 2022 02:41)  HR: 55 (27 Apr 2022 12:10) (16 - 67)  BP: 104/56 (27 Apr 2022 12:10) (98/48 - 131/65)  BP(mean): 70 (27 Apr 2022 11:00) (68 - 81)  RR: 18 (27 Apr 2022 12:10) (15 - 22)  SpO2: 94% (27 Apr 2022 12:10) (92% - 98%)    Labs:                        13.7   6.68  )-----------( 97       ( 27 Apr 2022 09:59 )             40.8       04-27    142  |  110<H>  |  14  ----------------------------<  122<H>  3.3<L>   |  22  |  0.59    Ca    7.9<L>      27 Apr 2022 09:59    TPro  6.6  /  Alb  3.9  /  TBili  1.2  /  DBili  x   /  AST  28  /  ALT  32  /  AlkPhos  82  04-26        Physical Exam:  Gen: NAD  R LE:   Dressing C/D/I  Motor intact +EHL/FHL/TA/GS. Sensation is grossly intact.   Compartments are soft, extremities are warm, DP 1+      A/P: Patient is a 88y y/o Male s/p closed reduction percutaneous pinning right femur, POD #0   -Pain control/analgesia  -Antibiotics - Ancef postop  -DVT ppx  - Lovenox  -Incentive spirometry  -Venodynes  -F/U AM Labs  -PT/OT/WBAT  -Bhatti - monitor output  -Notify Orthopedics with any questions

## 2022-04-27 NOTE — PHYSICAL THERAPY INITIAL EVALUATION ADULT - ADDITIONAL COMMENTS
Pt lethargic; Information to be confirmed with care coordination assessment when available. Pt reports living in a house with 3-4 exterior steps to enter, + flight of stairs within home however pt's bedroom is on first level. Pt has a home health aide 7 days per week for 8-10 hours. Prior to admission, pt reports ambulating independently with a cane and requiring assist with ADLs.

## 2022-04-27 NOTE — PHYSICAL THERAPY INITIAL EVALUATION ADULT - GENERAL OBSERVATIONS, REHAB EVAL
Pt received semisupine in bed, +IV, +condom catheter, in NAD. Pt agreeable to participate in PT evaluation. Pt left semisupine in bed as found, all lines intact and RN aware.

## 2022-04-27 NOTE — PROGRESS NOTE ADULT - SUBJECTIVE AND OBJECTIVE BOX
Patient is a 88y old  Male who presents with a chief complaint of R hip pain (27 Apr 2022 12:40)      SUBJECTIVE / OVERNIGHT EVENTS:    MEDICATIONS  (STANDING):  carbidopa/levodopa  25/100 0.5 Tablet(s) Oral <User Schedule>  carbidopa/levodopa  25/100 1 Tablet(s) Oral <User Schedule>  ceFAZolin   IVPB 2000 milliGRAM(s) IV Intermittent every 8 hours  chlorhexidine 2% Cloths 1 Application(s) Topical once  dextrose 5% + sodium chloride 0.9%. 1000 milliLiter(s) (100 mL/Hr) IV Continuous <Continuous>  donepezil 10 milliGRAM(s) Oral at bedtime  levothyroxine 50 MICROGram(s) Oral daily  melatonin 6 milliGRAM(s) Oral at bedtime  polyethylene glycol 3350 17 Gram(s) Oral daily  potassium chloride   Powder 20 milliEquivalent(s) Oral once  senna 2 Tablet(s) Oral at bedtime  sodium chloride 0.9%. 1000 milliLiter(s) (125 mL/Hr) IV Continuous <Continuous>    MEDICATIONS  (PRN):  bisacodyl Suppository 10 milliGRAM(s) Rectal daily PRN Constipation  oxyCODONE    IR 2.5 milliGRAM(s) Oral every 4 hours PRN Moderate Pain (4 - 6)  oxyCODONE    IR 5 milliGRAM(s) Oral every 4 hours PRN Severe Pain (7 - 10)      Vital Signs Last 24 Hrs  T(C): 36.6 (27 Apr 2022 12:10), Max: 36.9 (27 Apr 2022 02:41)  T(F): 97.8 (27 Apr 2022 12:10), Max: 98.4 (27 Apr 2022 02:41)  HR: 55 (27 Apr 2022 12:10) (16 - 67)  BP: 104/56 (27 Apr 2022 12:10) (98/48 - 131/65)  BP(mean): 70 (27 Apr 2022 11:00) (68 - 81)  RR: 18 (27 Apr 2022 12:10) (15 - 22)  SpO2: 94% (27 Apr 2022 12:10) (92% - 98%)  CAPILLARY BLOOD GLUCOSE        I&O's Summary    26 Apr 2022 07:01  -  27 Apr 2022 07:00  --------------------------------------------------------  IN: 0 mL / OUT: 200 mL / NET: -200 mL    27 Apr 2022 07:01  -  27 Apr 2022 13:46  --------------------------------------------------------  IN: 375 mL / OUT: 0 mL / NET: 375 mL        PHYSICAL EXAM:  GENERAL: NAD, well-developed  HEAD:  Atraumatic, Normocephalic  EYES: EOMI, PERRLA, conjunctiva and sclera clear  NECK: Supple, No JVD  CHEST/LUNG: Clear to auscultation bilaterally; No wheeze  HEART: Regular rate and rhythm; No murmurs, rubs, or gallops  ABDOMEN: Soft, Nontender, Nondistended; Bowel sounds present  EXTREMITIES:  2+ Peripheral Pulses, No clubbing, cyanosis, or edema  PSYCH: AAOx3  NEUROLOGY: non-focal  SKIN: No rashes or lesions    LABS:                        13.7   6.68  )-----------( 97       ( 27 Apr 2022 09:59 )             40.8     04-27    142  |  110<H>  |  14  ----------------------------<  122<H>  3.3<L>   |  22  |  0.59    Ca    7.9<L>      27 Apr 2022 09:59    TPro  6.6  /  Alb  3.9  /  TBili  1.2  /  DBili  x   /  AST  28  /  ALT  32  /  AlkPhos  82  04-26    PT/INR - ( 27 Apr 2022 05:23 )   PT: 12.9 sec;   INR: 1.11 ratio         PTT - ( 27 Apr 2022 05:23 )  PTT:33.1 sec          RADIOLOGY & ADDITIONAL TESTS:    Imaging Personally Reviewed:    Consultant(s) Notes Reviewed:      Care Discussed with Consultants/Other Providers:   Patient is a 88y old  Male who presents with a chief complaint of R hip pain (27 Apr 2022 12:40)      SUBJECTIVE / OVERNIGHT EVENTS:  Patient sleepy after surgery. Patient has no new complaints. Denies cp, SOB, abdominal pain, N/V/D     MEDICATIONS  (STANDING):  carbidopa/levodopa  25/100 0.5 Tablet(s) Oral <User Schedule>  carbidopa/levodopa  25/100 1 Tablet(s) Oral <User Schedule>  ceFAZolin   IVPB 2000 milliGRAM(s) IV Intermittent every 8 hours  chlorhexidine 2% Cloths 1 Application(s) Topical once  dextrose 5% + sodium chloride 0.9%. 1000 milliLiter(s) (100 mL/Hr) IV Continuous <Continuous>  donepezil 10 milliGRAM(s) Oral at bedtime  levothyroxine 50 MICROGram(s) Oral daily  melatonin 6 milliGRAM(s) Oral at bedtime  polyethylene glycol 3350 17 Gram(s) Oral daily  potassium chloride   Powder 20 milliEquivalent(s) Oral once  senna 2 Tablet(s) Oral at bedtime  sodium chloride 0.9%. 1000 milliLiter(s) (125 mL/Hr) IV Continuous <Continuous>    MEDICATIONS  (PRN):  bisacodyl Suppository 10 milliGRAM(s) Rectal daily PRN Constipation  oxyCODONE    IR 2.5 milliGRAM(s) Oral every 4 hours PRN Moderate Pain (4 - 6)  oxyCODONE    IR 5 milliGRAM(s) Oral every 4 hours PRN Severe Pain (7 - 10)      Vital Signs Last 24 Hrs  T(C): 36.6 (27 Apr 2022 12:10), Max: 36.9 (27 Apr 2022 02:41)  T(F): 97.8 (27 Apr 2022 12:10), Max: 98.4 (27 Apr 2022 02:41)  HR: 55 (27 Apr 2022 12:10) (16 - 67)  BP: 104/56 (27 Apr 2022 12:10) (98/48 - 131/65)  BP(mean): 70 (27 Apr 2022 11:00) (68 - 81)  RR: 18 (27 Apr 2022 12:10) (15 - 22)  SpO2: 94% (27 Apr 2022 12:10) (92% - 98%)  CAPILLARY BLOOD GLUCOSE        I&O's Summary    26 Apr 2022 07:01  -  27 Apr 2022 07:00  --------------------------------------------------------  IN: 0 mL / OUT: 200 mL / NET: -200 mL    27 Apr 2022 07:01  -  27 Apr 2022 13:46  --------------------------------------------------------  IN: 375 mL / OUT: 0 mL / NET: 375 mL        PHYSICAL EXAM:  GENERAL: NAD, well-developed  HEAD:  Atraumatic, Normocephalic  EYES: EOMI, PERRLA, conjunctiva and sclera clear  NECK: Supple, No JVD  CHEST/LUNG: Clear to auscultation bilaterally; No wheeze  HEART: Regular rate and rhythm; No murmurs, rubs, or gallops  ABDOMEN: Soft, Nontender, Nondistended; Bowel sounds present  EXTREMITIES:  2+ Peripheral Pulses, No clubbing, cyanosis, or edema  PSYCH: AAOx2  NEUROLOGY: non-focal  SKIN: No rashes or lesions    LABS:                        13.7   6.68  )-----------( 97       ( 27 Apr 2022 09:59 )             40.8     04-27    142  |  110<H>  |  14  ----------------------------<  122<H>  3.3<L>   |  22  |  0.59    Ca    7.9<L>      27 Apr 2022 09:59    TPro  6.6  /  Alb  3.9  /  TBili  1.2  /  DBili  x   /  AST  28  /  ALT  32  /  AlkPhos  82  04-26    PT/INR - ( 27 Apr 2022 05:23 )   PT: 12.9 sec;   INR: 1.11 ratio         PTT - ( 27 Apr 2022 05:23 )  PTT:33.1 sec          RADIOLOGY & ADDITIONAL TESTS:    Imaging Personally Reviewed:    Consultant(s) Notes Reviewed:      Care Discussed with Consultants/Other Providers:

## 2022-04-27 NOTE — BRIEF OPERATIVE NOTE - NSICDXBRIEFPROCEDURE_GEN_ALL_CORE_FT
PROCEDURES:  Closed reduction and percutaneous pinning of right hip 27-Apr-2022 09:50:11  Miguel Lind

## 2022-04-27 NOTE — OCCUPATIONAL THERAPY INITIAL EVALUATION ADULT - NS ASR WT BEARING DETAIL RLE
Pharmacy is  requesting medication refill.  Please approve or deny this request.    Rx requested:  Requested Prescriptions     Pending Prescriptions Disp Refills    metoprolol (LOPRESSOR) 100 MG tablet 180 tablet 0     Sig: Take 1 tablet by mouth 2 times daily    rosuvastatin (CRESTOR) 10 MG tablet 90 tablet 0     Sig: TAKE ONE TABLET BY MOUTH NIGHTLY         Last Office Visit:   10/1/2020      Next Visit Date:  Future Appointments   Date Time Provider Iliana Garcia   2/11/2021  1:30 PM SCHEDULE, [facility] CLINICAL PHARMACY [facility] Clin Rx None   2/19/2021  1:30 PM Patrick Kothari MD [facility]   3/1/2021  3:30 PM MD [place]   4/26/2021  8:30 AM MD Preston Coyne weight-bearing as tolerated

## 2022-04-28 ENCOUNTER — TRANSCRIPTION ENCOUNTER (OUTPATIENT)
Age: 87
End: 2022-04-28

## 2022-04-28 LAB
ANION GAP SERPL CALC-SCNC: 10 MMOL/L — SIGNIFICANT CHANGE UP (ref 7–14)
BUN SERPL-MCNC: 12 MG/DL — SIGNIFICANT CHANGE UP (ref 7–23)
CALCIUM SERPL-MCNC: 8.1 MG/DL — LOW (ref 8.4–10.5)
CHLORIDE SERPL-SCNC: 109 MMOL/L — HIGH (ref 98–107)
CO2 SERPL-SCNC: 22 MMOL/L — SIGNIFICANT CHANGE UP (ref 22–31)
CREAT SERPL-MCNC: 0.63 MG/DL — SIGNIFICANT CHANGE UP (ref 0.5–1.3)
EGFR: 91 ML/MIN/1.73M2 — SIGNIFICANT CHANGE UP
GLUCOSE SERPL-MCNC: 103 MG/DL — HIGH (ref 70–99)
HCT VFR BLD CALC: 40.2 % — SIGNIFICANT CHANGE UP (ref 39–50)
HGB BLD-MCNC: 13.6 G/DL — SIGNIFICANT CHANGE UP (ref 13–17)
MCHC RBC-ENTMCNC: 31.3 PG — SIGNIFICANT CHANGE UP (ref 27–34)
MCHC RBC-ENTMCNC: 33.8 GM/DL — SIGNIFICANT CHANGE UP (ref 32–36)
MCV RBC AUTO: 92.6 FL — SIGNIFICANT CHANGE UP (ref 80–100)
NRBC # BLD: 0 /100 WBCS — SIGNIFICANT CHANGE UP
NRBC # FLD: 0 K/UL — SIGNIFICANT CHANGE UP
PLATELET # BLD AUTO: 96 K/UL — LOW (ref 150–400)
POTASSIUM SERPL-MCNC: 3.6 MMOL/L — SIGNIFICANT CHANGE UP (ref 3.5–5.3)
POTASSIUM SERPL-SCNC: 3.6 MMOL/L — SIGNIFICANT CHANGE UP (ref 3.5–5.3)
RBC # BLD: 4.34 M/UL — SIGNIFICANT CHANGE UP (ref 4.2–5.8)
RBC # FLD: 12.5 % — SIGNIFICANT CHANGE UP (ref 10.3–14.5)
SODIUM SERPL-SCNC: 141 MMOL/L — SIGNIFICANT CHANGE UP (ref 135–145)
WBC # BLD: 5.08 K/UL — SIGNIFICANT CHANGE UP (ref 3.8–10.5)
WBC # FLD AUTO: 5.08 K/UL — SIGNIFICANT CHANGE UP (ref 3.8–10.5)

## 2022-04-28 PROCEDURE — 99233 SBSQ HOSP IP/OBS HIGH 50: CPT

## 2022-04-28 RX ORDER — FINASTERIDE 5 MG/1
5 TABLET, FILM COATED ORAL DAILY
Refills: 0 | Status: DISCONTINUED | OUTPATIENT
Start: 2022-04-28 | End: 2022-04-29

## 2022-04-28 RX ORDER — MEMANTINE HYDROCHLORIDE 10 MG/1
5 TABLET ORAL
Refills: 0 | Status: DISCONTINUED | OUTPATIENT
Start: 2022-04-28 | End: 2022-04-29

## 2022-04-28 RX ORDER — ROPINIROLE 8 MG/1
5 TABLET, FILM COATED, EXTENDED RELEASE ORAL AT BEDTIME
Refills: 0 | Status: DISCONTINUED | OUTPATIENT
Start: 2022-04-28 | End: 2022-04-29

## 2022-04-28 RX ORDER — ACETAMINOPHEN 500 MG
650 TABLET ORAL EVERY 6 HOURS
Refills: 0 | Status: DISCONTINUED | OUTPATIENT
Start: 2022-04-28 | End: 2022-04-29

## 2022-04-28 RX ADMIN — DONEPEZIL HYDROCHLORIDE 10 MILLIGRAM(S): 10 TABLET, FILM COATED ORAL at 22:01

## 2022-04-28 RX ADMIN — MEMANTINE HYDROCHLORIDE 5 MILLIGRAM(S): 10 TABLET ORAL at 17:27

## 2022-04-28 RX ADMIN — POLYETHYLENE GLYCOL 3350 17 GRAM(S): 17 POWDER, FOR SOLUTION ORAL at 12:51

## 2022-04-28 RX ADMIN — ENOXAPARIN SODIUM 40 MILLIGRAM(S): 100 INJECTION SUBCUTANEOUS at 05:25

## 2022-04-28 RX ADMIN — Medication 50 MICROGRAM(S): at 05:24

## 2022-04-28 RX ADMIN — ROPINIROLE 5 MILLIGRAM(S): 8 TABLET, FILM COATED, EXTENDED RELEASE ORAL at 22:02

## 2022-04-28 RX ADMIN — CARBIDOPA AND LEVODOPA 0.5 TABLET(S): 25; 100 TABLET ORAL at 12:51

## 2022-04-28 RX ADMIN — Medication 400 MILLIGRAM(S): at 14:45

## 2022-04-28 RX ADMIN — Medication 400 MILLIGRAM(S): at 07:42

## 2022-04-28 RX ADMIN — FINASTERIDE 5 MILLIGRAM(S): 5 TABLET, FILM COATED ORAL at 12:51

## 2022-04-28 RX ADMIN — CARBIDOPA AND LEVODOPA 1 TABLET(S): 25; 100 TABLET ORAL at 08:12

## 2022-04-28 RX ADMIN — SENNA PLUS 2 TABLET(S): 8.6 TABLET ORAL at 22:01

## 2022-04-28 RX ADMIN — Medication 100 MILLIGRAM(S): at 00:38

## 2022-04-28 NOTE — PROGRESS NOTE ADULT - SUBJECTIVE AND OBJECTIVE BOX
ORTHO PROGRESS NOTE     Pt seen and examined at bedside, denies SOB, CP, Dizziness. N/V/D /HA.  No significant overnight events. Pain well controlled.     Vital Signs Last 24 Hrs  T(C): 36.4 (28 Apr 2022 05:23), Max: 37.1 (28 Apr 2022 01:42)  T(F): 97.6 (28 Apr 2022 05:23), Max: 98.8 (28 Apr 2022 01:42)  HR: 65 (28 Apr 2022 05:23) (16 - 67)  BP: 124/63 (28 Apr 2022 05:23) (104/56 - 131/65)  BP(mean): 70 (27 Apr 2022 11:00) (68 - 81)  RR: 17 (28 Apr 2022 05:23) (15 - 22)  SpO2: 94% (28 Apr 2022 05:23) (92% - 97%)    Gen: No apparent distress    right LE:  Dressing C/D I    BLE: motor intact EHL/FHL/TA/GS .  Sensation is grossly intact to light touch in the distal extremities.    Compartments are soft bilaterally.  Extremities are warm .  DP 2+ BLE     Labs:  CBC Full  -  ( 28 Apr 2022 05:23 )  WBC Count : 5.08 K/uL  RBC Count : 4.34 M/uL  Hemoglobin : 13.6 g/dL  Hematocrit : 40.2 %  Platelet Count - Automated : 96 K/uL  Mean Cell Volume : 92.6 fL  Mean Cell Hemoglobin : 31.3 pg  Mean Cell Hemoglobin Concentration : 33.8 gm/dL  Auto Neutrophil # : x  Auto Lymphocyte # : x  Auto Monocyte # : x  Auto Eosinophil # : x  Auto Basophil # : x  Auto Neutrophil % : x  Auto Lymphocyte % : x  Auto Monocyte % : x  Auto Eosinophil % : x  Auto Basophil % : x        04-28    141  |  109<H>  |  12  ----------------------------<  103<H>  3.6   |  22  |  0.63    Ca    8.1<L>      28 Apr 2022 05:23           A/P  s/p right hip CRPP, POD #1    - Pain control/ Analgesia  - DVT ppx Lovenox, foot pumps  - PT/OT - wbat/oob    - Incentive Spirometer  - Rehab planning   - notify Ortho for questions

## 2022-04-28 NOTE — DISCHARGE NOTE PROVIDER - NSDCQMCOGNITION_NEU_ALL_CORE
No difficulties Difficulty making decisions Difficulty concentrating/Difficulty making decisions/Difficulty remembering

## 2022-04-28 NOTE — DISCHARGE NOTE PROVIDER - NSDCMRMEDTOKEN_GEN_ALL_CORE_FT
carbidopa-levodopa 25 mg-100 mg oral tablet: 1 tab(s) orally 2 times a day  donepezil 10 mg oral tablet: 1 tab(s) orally once a day (at bedtime)  levothyroxine 50 mcg (0.05 mg) oral tablet: 1 tab(s) orally once a day  rOPINIRole 5 mg oral tablet: 1 tab(s) orally once a day (at bedtime)   carbidopa-levodopa 25 mg-100 mg oral tablet: 1 tab(s) orally 2 times a day  donepezil 10 mg oral tablet: 1 tab(s) orally once a day (at bedtime)  dutasteride 0.5 mg oral capsule: 1 cap(s) orally once a day  levothyroxine 50 mcg (0.05 mg) oral tablet: 1 tab(s) orally once a day  memantine 5 mg oral tablet: 1 tab(s) orally 2 times a day  rOPINIRole 5 mg oral tablet: 1 tab(s) orally once a day (at bedtime)   acetaminophen 325 mg oral tablet: 2 tab(s) orally every 6 hours, As needed, Mild Pain (1 - 3), Moderate Pain (4 - 6), Severe Pain (7 - 10)  carbidopa-levodopa 25 mg-100 mg oral tablet: 1 tab(s) orally 2 times a day  donepezil 10 mg oral tablet: 1 tab(s) orally once a day (at bedtime)  dutasteride 0.5 mg oral capsule: 1 cap(s) orally once a day  enoxaparin: 40 milligram(s) subcutaneous once a day for DVT ppx  levothyroxine 50 mcg (0.05 mg) oral tablet: 1 tab(s) orally once a day  melatonin 3 mg oral tablet: 2 tab(s) orally once a day (at bedtime)  memantine 5 mg oral tablet: 1 tab(s) orally 2 times a day  oxyCODONE 5 mg oral tablet: 1 tab(s) orally every 4 hours, As needed, Severe Pain (7 - 10)  polyethylene glycol 3350 oral powder for reconstitution: 17 gram(s) orally once a day  rOPINIRole 5 mg oral tablet: 1 tab(s) orally once a day (at bedtime)  senna oral tablet: 2 tab(s) orally once a day (at bedtime)

## 2022-04-28 NOTE — DISCHARGE NOTE PROVIDER - NSDCCPTREATMENT_GEN_ALL_CORE_FT
PRINCIPAL PROCEDURE  Procedure: Percutaneous pinning of right hip  Findings and Treatment:        PRINCIPAL PROCEDURE  Procedure: Percutaneous pinning of right hip  Findings and Treatment: 87 y/o Male/Female presents to [facility] s/p mechanical fall, found to have right femoral neck fracture. Patient s/p right hip closed reduction percutaneous pinning with Dr. Gonzalez on 4/27/2022. Patient tolerated the procedure well without any intraoperative complications. Patient tolerated physical therapy well, pain is controlled. Pt is weight bearing as tolerated with cane/walker as needed. Seen by medical attending for continuity of care and management and cleared for safe discharge. Keep dressing/incision clean, dry and intact. Any suture/staples to be removed on post-op day #14 at office visit. Pt is on Lovenox 40mg daily for DVT prophylaxis, please take for 6 weeks unless otherwise instructed by your surgeon. Please follow up with Dr. Gonzalez in 2 weeks, call office to make appointment, [phone]. Please follow up with your PMD for continuity of care and management as medications may have changed.

## 2022-04-28 NOTE — PROGRESS NOTE ADULT - SUBJECTIVE AND OBJECTIVE BOX
Patient is a 88y old  Male who presents with a chief complaint of S/P right hip CRPP 4/27/22 (28 Apr 2022 08:10)      SUBJECTIVE / OVERNIGHT EVENTS:    MEDICATIONS  (STANDING):  acetaminophen   IVPB .. 1000 milliGRAM(s) IV Intermittent once  carbidopa/levodopa  25/100 0.5 Tablet(s) Oral <User Schedule>  carbidopa/levodopa  25/100 1 Tablet(s) Oral <User Schedule>  chlorhexidine 2% Cloths 1 Application(s) Topical once  dextrose 5% + sodium chloride 0.9%. 1000 milliLiter(s) (100 mL/Hr) IV Continuous <Continuous>  donepezil 10 milliGRAM(s) Oral at bedtime  enoxaparin Injectable 40 milliGRAM(s) SubCutaneous every 24 hours  levothyroxine 50 MICROGram(s) Oral daily  melatonin 6 milliGRAM(s) Oral at bedtime  polyethylene glycol 3350 17 Gram(s) Oral daily  senna 2 Tablet(s) Oral at bedtime  sodium chloride 0.9%. 1000 milliLiter(s) (125 mL/Hr) IV Continuous <Continuous>    MEDICATIONS  (PRN):  bisacodyl Suppository 10 milliGRAM(s) Rectal daily PRN Constipation  oxyCODONE    IR 2.5 milliGRAM(s) Oral every 4 hours PRN Moderate Pain (4 - 6)  oxyCODONE    IR 5 milliGRAM(s) Oral every 4 hours PRN Severe Pain (7 - 10)      Vital Signs Last 24 Hrs  T(C): 36.5 (28 Apr 2022 09:57), Max: 37.1 (28 Apr 2022 01:42)  T(F): 97.7 (28 Apr 2022 09:57), Max: 98.8 (28 Apr 2022 01:42)  HR: 63 (28 Apr 2022 09:57) (55 - 66)  BP: 121/56 (28 Apr 2022 09:57) (104/56 - 131/65)  BP(mean): 70 (27 Apr 2022 11:00) (68 - 81)  RR: 18 (28 Apr 2022 09:57) (16 - 22)  SpO2: 92% (28 Apr 2022 09:57) (92% - 97%)  CAPILLARY BLOOD GLUCOSE        I&O's Summary    27 Apr 2022 07:01  -  28 Apr 2022 07:00  --------------------------------------------------------  IN: 375 mL / OUT: 1400 mL / NET: -1025 mL    28 Apr 2022 07:01  -  28 Apr 2022 09:58  --------------------------------------------------------  IN: 0 mL / OUT: 100 mL / NET: -100 mL        PHYSICAL EXAM:  GENERAL: NAD, well-developed  HEAD:  Atraumatic, Normocephalic  EYES: EOMI, PERRLA, conjunctiva and sclera clear  NECK: Supple, No JVD  CHEST/LUNG: Clear to auscultation bilaterally; No wheeze  HEART: Regular rate and rhythm; No murmurs, rubs, or gallops  ABDOMEN: Soft, Nontender, Nondistended; Bowel sounds present  EXTREMITIES:  2+ Peripheral Pulses, No clubbing, cyanosis, or edema  PSYCH: AAOx3  NEUROLOGY: non-focal  SKIN: No rashes or lesions    LABS:                        13.6   5.08  )-----------( 96       ( 28 Apr 2022 05:23 )             40.2     04-28    141  |  109<H>  |  12  ----------------------------<  103<H>  3.6   |  22  |  0.63    Ca    8.1<L>      28 Apr 2022 05:23      PT/INR - ( 27 Apr 2022 05:23 )   PT: 12.9 sec;   INR: 1.11 ratio         PTT - ( 27 Apr 2022 05:23 )  PTT:33.1 sec          RADIOLOGY & ADDITIONAL TESTS:    Imaging Personally Reviewed:    Consultant(s) Notes Reviewed:      Care Discussed with Consultants/Other Providers:   Patient is a 88y old  Male who presents with a chief complaint of S/P right hip CRPP 4/27/22 (28 Apr 2022 08:10)      SUBJECTIVE / OVERNIGHT EVENTS:  Patient has no new complaints. Denies cp, SOB, abdominal pain, N/V/D     MEDICATIONS  (STANDING):  acetaminophen   IVPB .. 1000 milliGRAM(s) IV Intermittent once  carbidopa/levodopa  25/100 0.5 Tablet(s) Oral <User Schedule>  carbidopa/levodopa  25/100 1 Tablet(s) Oral <User Schedule>  chlorhexidine 2% Cloths 1 Application(s) Topical once  dextrose 5% + sodium chloride 0.9%. 1000 milliLiter(s) (100 mL/Hr) IV Continuous <Continuous>  donepezil 10 milliGRAM(s) Oral at bedtime  enoxaparin Injectable 40 milliGRAM(s) SubCutaneous every 24 hours  levothyroxine 50 MICROGram(s) Oral daily  melatonin 6 milliGRAM(s) Oral at bedtime  polyethylene glycol 3350 17 Gram(s) Oral daily  senna 2 Tablet(s) Oral at bedtime  sodium chloride 0.9%. 1000 milliLiter(s) (125 mL/Hr) IV Continuous <Continuous>    MEDICATIONS  (PRN):  bisacodyl Suppository 10 milliGRAM(s) Rectal daily PRN Constipation  oxyCODONE    IR 2.5 milliGRAM(s) Oral every 4 hours PRN Moderate Pain (4 - 6)  oxyCODONE    IR 5 milliGRAM(s) Oral every 4 hours PRN Severe Pain (7 - 10)      Vital Signs Last 24 Hrs  T(C): 36.5 (28 Apr 2022 09:57), Max: 37.1 (28 Apr 2022 01:42)  T(F): 97.7 (28 Apr 2022 09:57), Max: 98.8 (28 Apr 2022 01:42)  HR: 63 (28 Apr 2022 09:57) (55 - 66)  BP: 121/56 (28 Apr 2022 09:57) (104/56 - 131/65)  BP(mean): 70 (27 Apr 2022 11:00) (68 - 81)  RR: 18 (28 Apr 2022 09:57) (16 - 22)  SpO2: 92% (28 Apr 2022 09:57) (92% - 97%)  CAPILLARY BLOOD GLUCOSE        I&O's Summary    27 Apr 2022 07:01  -  28 Apr 2022 07:00  --------------------------------------------------------  IN: 375 mL / OUT: 1400 mL / NET: -1025 mL    28 Apr 2022 07:01  -  28 Apr 2022 09:58  --------------------------------------------------------  IN: 0 mL / OUT: 100 mL / NET: -100 mL        PHYSICAL EXAM:  GENERAL: NAD, well-developed  HEAD:  Atraumatic, Normocephalic  EYES: EOMI, PERRLA, conjunctiva and sclera clear  NECK: Supple, No JVD  CHEST/LUNG: Clear to auscultation bilaterally; No wheeze  HEART: Regular rate and rhythm; No murmurs, rubs, or gallops  ABDOMEN: Soft, Nontender, Nondistended; Bowel sounds present  EXTREMITIES:  2+ Peripheral Pulses, No clubbing, cyanosis, or edema  PSYCH: AAOx3  NEUROLOGY: non-focal  SKIN: No rashes or lesions    LABS:                        13.6   5.08  )-----------( 96       ( 28 Apr 2022 05:23 )             40.2     04-28    141  |  109<H>  |  12  ----------------------------<  103<H>  3.6   |  22  |  0.63    Ca    8.1<L>      28 Apr 2022 05:23      PT/INR - ( 27 Apr 2022 05:23 )   PT: 12.9 sec;   INR: 1.11 ratio         PTT - ( 27 Apr 2022 05:23 )  PTT:33.1 sec          RADIOLOGY & ADDITIONAL TESTS:    Imaging Personally Reviewed:    Consultant(s) Notes Reviewed:      Care Discussed with Consultants/Other Providers:

## 2022-04-28 NOTE — DISCHARGE NOTE PROVIDER - HOSPITAL COURSE
88 Pt is s/p right hip CRPP 4/27/22  without any intraoperative complications.  Pt is doing well and stable for discharge.  Pt is tolerating physical therapy: WBAT with cane/walker,  gait training.  Leave dressing on until post op office visit(POD#14).  Have sutures/staples removed POD#14 if present.  Pt is on Lovenox for DVT prophylaxis take for 6 weeks unless otherwise directed by surgeon.  follow up with Dr. Gonzalez in two weeks.  Follow up with primary care doctor in 1-2 weeks for continuity of care.  The patient had no post-operative complications and clinically progressed faster than anticipated.   The patient met criteria for discharge before the 2nd night of stay. The patient was safely and appropriately discharged. 89 y/o Male/Female presents to [facility] s/p mechanical fall, found to have right femoral neck fracture. Patient s/p right hip closed reduction percutaneous pinning with Dr. Gonzalez on 4/27/2022. Patient tolerated the procedure well without any intraoperative complications. Patient tolerated physical therapy well, pain is controlled. Pt is weight bearing as tolerated with cane/walker as needed. Seen by medical attending for continuity of care and management and cleared for safe discharge. Keep dressing/incision clean, dry and intact. Any suture/staples to be removed on post-op day #14 at office visit. Pt is on Lovenox 40mg daily for DVT prophylaxis, please take for 6 weeks unless otherwise instructed by your surgeon. Please follow up with Dr. Gonzalez in 2 weeks, call office to make appointment, [phone]. Please follow up with your PMD for continuity of care and management as medications may have changed.    89 y/o Male presents to [facility] ED s/p mechanical fall, found to have right femoral neck fracture. Patient s/p right hip closed reduction percutaneous pinning with Dr. Gonzalez on 4/27/2022. Patient tolerated the procedure well without any intraoperative complications. Patient tolerated physical therapy well, pain is controlled. Pt is weight bearing as tolerated with cane/walker as needed. Seen by medical attending for continuity of care and management and cleared for safe discharge. Keep dressing/incision clean, dry and intact. Any suture/staples to be removed on post-op day #14 at office visit. Pt is on Lovenox 40mg daily for DVT prophylaxis, please take for 6 weeks unless otherwise instructed by your surgeon. Please follow up with Dr. Gonzalez in 2 weeks, call office to make appointment, [phone]. Please follow up with your PMD for continuity of care and management as medications may have changed.

## 2022-04-28 NOTE — PROGRESS NOTE ADULT - SUBJECTIVE AND OBJECTIVE BOX
ANESTHESIA POSTOP CHECK    88y Male POSTOP DAY 1    Vital Signs Last 24 Hrs  T(C): 36.5 (28 Apr 2022 09:57), Max: 37.1 (28 Apr 2022 01:42)  T(F): 97.7 (28 Apr 2022 09:57), Max: 98.8 (28 Apr 2022 01:42)  HR: 63 (28 Apr 2022 09:57) (55 - 65)  BP: 121/56 (28 Apr 2022 09:57) (104/56 - 130/70)  BP(mean): --  RR: 18 (28 Apr 2022 09:57) (16 - 18)  SpO2: 92% (28 Apr 2022 09:57) (92% - 95%)  I&O's Summary    27 Apr 2022 07:01  -  28 Apr 2022 07:00  --------------------------------------------------------  IN: 375 mL / OUT: 1400 mL / NET: -1025 mL    28 Apr 2022 07:01  -  28 Apr 2022 11:26  --------------------------------------------------------  IN: 0 mL / OUT: 100 mL / NET: -100 mL        [X ] NO APPARENT ANESTHESIA COMPLICATIONS      Comments:

## 2022-04-28 NOTE — DISCHARGE NOTE PROVIDER - NSDCCPCAREPLAN_GEN_ALL_CORE_FT
PRINCIPAL DISCHARGE DIAGNOSIS  Diagnosis: Femoral neck fracture  Assessment and Plan of Treatment:

## 2022-04-29 ENCOUNTER — TRANSCRIPTION ENCOUNTER (OUTPATIENT)
Age: 87
End: 2022-04-29

## 2022-04-29 VITALS
HEART RATE: 75 BPM | TEMPERATURE: 98 F | OXYGEN SATURATION: 97 % | DIASTOLIC BLOOD PRESSURE: 60 MMHG | RESPIRATION RATE: 16 BRPM | SYSTOLIC BLOOD PRESSURE: 122 MMHG

## 2022-04-29 DIAGNOSIS — K59.01 SLOW TRANSIT CONSTIPATION: ICD-10-CM

## 2022-04-29 LAB — SARS-COV-2 RNA SPEC QL NAA+PROBE: SIGNIFICANT CHANGE UP

## 2022-04-29 PROCEDURE — 99232 SBSQ HOSP IP/OBS MODERATE 35: CPT

## 2022-04-29 RX ORDER — POLYETHYLENE GLYCOL 3350 17 G/17G
17 POWDER, FOR SOLUTION ORAL
Qty: 0 | Refills: 0 | DISCHARGE
Start: 2022-04-29

## 2022-04-29 RX ORDER — LANOLIN ALCOHOL/MO/W.PET/CERES
2 CREAM (GRAM) TOPICAL
Qty: 0 | Refills: 0 | DISCHARGE
Start: 2022-04-29

## 2022-04-29 RX ORDER — OXYCODONE HYDROCHLORIDE 5 MG/1
1 TABLET ORAL
Qty: 0 | Refills: 0 | DISCHARGE
Start: 2022-04-29

## 2022-04-29 RX ORDER — ENOXAPARIN SODIUM 100 MG/ML
40 INJECTION SUBCUTANEOUS
Qty: 0 | Refills: 0 | DISCHARGE
Start: 2022-04-29

## 2022-04-29 RX ORDER — SENNA PLUS 8.6 MG/1
2 TABLET ORAL
Qty: 0 | Refills: 0 | DISCHARGE
Start: 2022-04-29

## 2022-04-29 RX ORDER — ACETAMINOPHEN 500 MG
2 TABLET ORAL
Qty: 0 | Refills: 0 | DISCHARGE
Start: 2022-04-29

## 2022-04-29 RX ADMIN — FINASTERIDE 5 MILLIGRAM(S): 5 TABLET, FILM COATED ORAL at 12:51

## 2022-04-29 RX ADMIN — Medication 650 MILLIGRAM(S): at 01:08

## 2022-04-29 RX ADMIN — ENOXAPARIN SODIUM 40 MILLIGRAM(S): 100 INJECTION SUBCUTANEOUS at 06:00

## 2022-04-29 RX ADMIN — POLYETHYLENE GLYCOL 3350 17 GRAM(S): 17 POWDER, FOR SOLUTION ORAL at 12:52

## 2022-04-29 RX ADMIN — OXYCODONE HYDROCHLORIDE 5 MILLIGRAM(S): 5 TABLET ORAL at 11:19

## 2022-04-29 RX ADMIN — OXYCODONE HYDROCHLORIDE 5 MILLIGRAM(S): 5 TABLET ORAL at 10:19

## 2022-04-29 RX ADMIN — Medication 50 MICROGRAM(S): at 06:00

## 2022-04-29 RX ADMIN — CARBIDOPA AND LEVODOPA 1 TABLET(S): 25; 100 TABLET ORAL at 10:11

## 2022-04-29 RX ADMIN — CARBIDOPA AND LEVODOPA 0.5 TABLET(S): 25; 100 TABLET ORAL at 12:52

## 2022-04-29 RX ADMIN — MEMANTINE HYDROCHLORIDE 5 MILLIGRAM(S): 10 TABLET ORAL at 18:15

## 2022-04-29 RX ADMIN — Medication 650 MILLIGRAM(S): at 02:08

## 2022-04-29 RX ADMIN — MEMANTINE HYDROCHLORIDE 5 MILLIGRAM(S): 10 TABLET ORAL at 06:00

## 2022-04-29 NOTE — PROGRESS NOTE ADULT - SUBJECTIVE AND OBJECTIVE BOX
[facility] Division of Hospital Medicine  Kevan Brian MD  In House Pager [phone]    Patient is a 88y old  Male who presents with a chief complaint of R hip pain (29 Apr 2022 02:12)      SUBJECTIVE / OVERNIGHT EVENTS:  NAEO, Patient seen and examined at bedside, no acute complaints today. reports pain from surgical site.   No fever, no chills, no SOB, no CP, no n/v/d, no abd pain, no dysuria      MEDICATIONS  (STANDING):  carbidopa/levodopa  25/100 0.5 Tablet(s) Oral <User Schedule>  carbidopa/levodopa  25/100 1 Tablet(s) Oral <User Schedule>  chlorhexidine 2% Cloths 1 Application(s) Topical once  dextrose 5% + sodium chloride 0.9%. 1000 milliLiter(s) (100 mL/Hr) IV Continuous <Continuous>  donepezil 10 milliGRAM(s) Oral at bedtime  enoxaparin Injectable 40 milliGRAM(s) SubCutaneous every 24 hours  finasteride 5 milliGRAM(s) Oral daily  levothyroxine 50 MICROGram(s) Oral daily  melatonin 6 milliGRAM(s) Oral at bedtime  memantine 5 milliGRAM(s) Oral two times a day  polyethylene glycol 3350 17 Gram(s) Oral daily  rOPINIRole 5 milliGRAM(s) Oral at bedtime  senna 2 Tablet(s) Oral at bedtime  sodium chloride 0.9%. 1000 milliLiter(s) (125 mL/Hr) IV Continuous <Continuous>    MEDICATIONS  (PRN):  acetaminophen     Tablet .. 650 milliGRAM(s) Oral every 6 hours PRN Mild Pain (1 - 3), Moderate Pain (4 - 6), Severe Pain (7 - 10)  bisacodyl Suppository 10 milliGRAM(s) Rectal daily PRN Constipation  oxyCODONE    IR 2.5 milliGRAM(s) Oral every 4 hours PRN Moderate Pain (4 - 6)  oxyCODONE    IR 5 milliGRAM(s) Oral every 4 hours PRN Severe Pain (7 - 10)    CAPILLARY BLOOD GLUCOSE        I&O's Summary    28 Apr 2022 07:01  -  29 Apr 2022 07:00  --------------------------------------------------------  IN: 0 mL / OUT: 1400 mL / NET: -1400 mL    29 Apr 2022 07:01  -  29 Apr 2022 12:05  --------------------------------------------------------  IN: 0 mL / OUT: 200 mL / NET: -200 mL        PHYSICAL EXAM:  Vital Signs Last 24 Hrs  T(C): 36.8 (29 Apr 2022 09:57), Max: 36.9 (28 Apr 2022 21:24)  T(F): 98.2 (29 Apr 2022 09:57), Max: 98.5 (29 Apr 2022 01:21)  HR: 56 (29 Apr 2022 09:57) (56 - 68)  BP: 120/57 (29 Apr 2022 09:57) (120/57 - 146/62)  BP(mean): --  RR: 18 (29 Apr 2022 09:57) (17 - 18)  SpO2: 95% (29 Apr 2022 09:57) (94% - 95%)    Gen: NAD; sitting in bed comfortably   Pulm: no accessory muscle use; lungs clear on auscultation bilaterally; no wheezing or crackles.   Cards: RRR, nl S1/S2; no LE edema; no JVD  Abd: non-distended; soft and NT on exam; +bs  Ext: JACEK; no joint effusion or tenderness in upper and lower extremities; no cyanosis  Neuro: Awake and Alert; non-focal; moving all extremities.   Skin: no new rashes; warm to touch;     LABS:                        13.6   5.08  )-----------( 96       ( 28 Apr 2022 05:23 )             40.2     04-28    141  |  109<H>  |  12  ----------------------------<  103<H>  3.6   |  22  |  0.63    Ca    8.1<L>      28 Apr 2022 05:23                  RADIOLOGY & ADDITIONAL TESTS:  Results Reviewed: Y  Imaging Personally Reviewed: Y  Electrocardiogram Personally Reviewed: Y    COORDINATION OF CARE:  Care Discussed with Consultants/Other Providers [Y/N]: Y  Prior or Outpatient Records Reviewed [Y/N]: Y

## 2022-04-29 NOTE — DISCHARGE NOTE NURSING/CASE MANAGEMENT/SOCIAL WORK - PATIENT PORTAL LINK FT
You can access the FollowMyHealth Patient Portal offered by [facility] by registering at the following website: [email or web address]. By joining [facility]’s FollowMyHealth portal, you will also be able to view your health information using other applications (apps) compatible with our system.

## 2022-04-29 NOTE — DISCHARGE NOTE NURSING/CASE MANAGEMENT/SOCIAL WORK - NSDPDISTO_GEN_ALL_CORE
Skilled Nursing Facility stable, right hip dressing in place clean dry and intact, tolerating diet, voiding,/Skilled Nursing Facility stable, right hip dressing in place clean dry and intact, tolerating diet, voiding,/Sub-Acute rehab

## 2022-04-29 NOTE — PROGRESS NOTE ADULT - SUBJECTIVE AND OBJECTIVE BOX
Pt seen/examined. Doing well. Pain controlled. No acute overnight complaints or events.    T(C): 36.9 (04-29-22 @ 01:21), Max: 36.9 (04-28-22 @ 21:24)  HR: 68 (04-29-22 @ 01:21) (63 - 68)  BP: 142/76 (04-29-22 @ 01:21) (121/56 - 146/62)  RR: 18 (04-29-22 @ 01:21) (17 - 18)  SpO2: 95% (04-29-22 @ 01:21) (92% - 95%)  Wt(kg): --  - Gen: NAD    Gen: No apparent distress    right LE:  Dressing C/D I    BLE: motor intact EHL/FHL/TA/GS .  Sensation is grossly intact to light touch in the distal extremities.    Compartments are soft bilaterally.  Extremities are warm .  DP 2+ BLE     A/P  s/p right hip CRPP, POD #2    - Pain control/ Analgesia  - DVT ppx Lovenox, foot pumps  - PT/OT - wbat/oob    - Incentive Spirometer  - Rehab planning   - notify Ortho for questions

## 2022-04-29 NOTE — PROGRESS NOTE ADULT - ASSESSMENT
87yo Male w/ pmhx of atypical parkinsons disease, mild dementia, hypothyroid, BPH, restless leg syndrome and possible mild emphysema (nonsmoker, per daughter) presents to the ER for R hip pain after a mechanical fall at home found to have R femoral neck fracture now s/p closed reduction percutaneous pinning of right femur on 4/27/22. 

## 2022-04-29 NOTE — DISCHARGE NOTE NURSING/CASE MANAGEMENT/SOCIAL WORK - NSDCPNINST_GEN_ALL_CORE
Notify Dr Gonzalez if you experience any increase in pain not relieved with medication, any redness, drainage or swelling around incision or any fever >100.5.  Drink plenty of fluids, no heavy lifting or straining.

## 2022-04-29 NOTE — DISCHARGE NOTE NURSING/CASE MANAGEMENT/SOCIAL WORK - NSDCPEFALRISK_GEN_ALL_CORE
For information on Fall & Injury Prevention, visit: [email or web address] OR  [email or web address] OR  https://www.cdc.gov/steadi/patient.html

## 2022-04-29 NOTE — DISCHARGE NOTE NURSING/CASE MANAGEMENT/SOCIAL WORK - NSDCPECAREGIVERED_GEN_ALL_CORE
carenotes on falls prevention, ORIF hip fracture, carenotes on hip fracture, falls prevention, carenotes on hip fracture ,  falls prevention

## 2022-05-02 ENCOUNTER — NON-APPOINTMENT (OUTPATIENT)
Age: 87
End: 2022-05-02

## 2022-05-11 ENCOUNTER — APPOINTMENT (OUTPATIENT)
Dept: ORTHOPEDIC SURGERY | Facility: CLINIC | Age: 87
End: 2022-05-11
Payer: MEDICARE

## 2022-05-11 PROCEDURE — 99024 POSTOP FOLLOW-UP VISIT: CPT

## 2022-05-11 PROCEDURE — 73501 X-RAY EXAM HIP UNI 1 VIEW: CPT

## 2022-05-11 PROCEDURE — 72170 X-RAY EXAM OF PELVIS: CPT | Mod: 26,RT,59

## 2022-05-11 NOTE — HISTORY OF PRESENT ILLNESS
[3] : the patient reports pain that is 3/10 in severity [Chills] : no chills [Constipation] : no constipation [Diarrhea] : no diarrhea [Dysuria] : no dysuria [Fever] : no fever [Nausea] : no nausea [Vomiting] : no vomiting [de-identified] : S/P right hip closed reduction percutaneous pinning 4/27/22 [de-identified] : Mr. BINDU CORLEY is a 88 year gentleman presenting for  s/p right hip closed reduction percutaneous fixation on 4/27/2022.  He is here with Ana his care manager at [facility].  He is walking approximately 25 feet twice a day with physical therapy.  His pain is well controlled.  He denies any fevers or chills. [de-identified] : The patient is sitting comfortably in the exam room. \par Right hip\par -incisions clean and dry, no erythema\par -No tenderness to palpation over the greater trochanter\par -Painless range of motion hip\par -Flexion: 90, Internal rotation: 15, External rotation: 15\par -Sensation is intact L1-S1\par -5/5 EHL, FHL, TA, GS, quadriceps, hamstrings\par -Foot is warm and well-perfused, palpable dorsalis pedis pulse [de-identified] : Xray of the right hip were taken in the office today.  X-rays include AP of the pelvis and lateral of the hip.  X-rays show some mild varus settling of the femoral neck fracture. [de-identified] : 88-year-old gentleman approximately 3 weeks out from closed reduction percutaneous fixation of the right femoral neck fracture, doing well [de-identified] : -Bearing as tolerated\par -Continue with physical therapy\par -Tylenol for pain\par -Follow up in 3 weeks with Xray of right hip at that time.\par -Spoke to the patient's daughter on the phone during the visit.  All of the patient's and patient's daughter's questions and concerns were addressed during this visit.  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

## 2022-05-23 ENCOUNTER — APPOINTMENT (OUTPATIENT)
Dept: GERIATRICS | Facility: CLINIC | Age: 87
End: 2022-05-23

## 2022-06-01 ENCOUNTER — APPOINTMENT (OUTPATIENT)
Dept: ORTHOPEDIC SURGERY | Facility: CLINIC | Age: 87
End: 2022-06-01
Payer: MEDICARE

## 2022-06-01 PROCEDURE — 99024 POSTOP FOLLOW-UP VISIT: CPT

## 2022-06-01 PROCEDURE — 72170 X-RAY EXAM OF PELVIS: CPT | Mod: RT,59

## 2022-06-01 PROCEDURE — 73501 X-RAY EXAM HIP UNI 1 VIEW: CPT

## 2022-06-01 NOTE — HISTORY OF PRESENT ILLNESS
[3] : the patient reports pain that is 3/10 in severity [Chills] : no chills [Constipation] : no constipation [Diarrhea] : no diarrhea [Dysuria] : no dysuria [Fever] : no fever [Nausea] : no nausea [Vomiting] : no vomiting [de-identified] : S/P right hip closed reduction percutaneous fixation 4/27/22. [de-identified] : Mr. BINDU CORLEY is a 89 yo male presenting for s/p right hip closed reduction percutaneous fixation on 4/27/2022. His aide stated he has been able to walk about 100 feet and get to the bathroom with the assist of an aide.  He reports his pain is improving.  He is no longer taking any pain medication. [de-identified] : The patient is sitting comfortably in the exam room. \par Right hip\par -incisions clean and dry, no erythema\par -No tenderness to palpation over the greater trochanter\par -Painless range of motion hip\par -Flexion: 90, Internal rotation: 15, External rotation: 15\par -Sensation is intact L1-S1\par -5/5 EHL, FHL, TA, GS, quadriceps, hamstrings\par -Foot is warm and well-perfused, palpable dorsalis pedis pulse [de-identified] : Xray of the right hip were taken in the office today, 6/1/22.  X-rays show no significant displacement from previous x-rays.  The hip has settled in a somewhat varus position.  This was evident at the last visit.  There is no cut out of the screws into the hip joint. [de-identified] : 88-year-old gentleman approximately 5 weeks out from closed reduction percutaneous fixation of the right femoral neck fracture, doing well. [de-identified] : -Weightbearing as tolerated\par -Continue with physical therapy\par -Tylenol for pain\par -Follow up in 3 months with Xray of right hip at that time.\par -Spoke to the patient's daughter on the phone during the visit.  All of the patient's and patient's daughter's questions and concerns were addressed during this visit.  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

## 2022-06-10 ENCOUNTER — APPOINTMENT (OUTPATIENT)
Dept: NEUROLOGY | Facility: CLINIC | Age: 87
End: 2022-06-10
Payer: MEDICARE

## 2022-06-10 DIAGNOSIS — G20 PARKINSON'S DISEASE: ICD-10-CM

## 2022-06-10 PROCEDURE — 99214 OFFICE O/P EST MOD 30 MIN: CPT | Mod: 95

## 2022-06-10 NOTE — PHYSICAL EXAM
[FreeTextEntry1] : Patient is awake and alert.  He is pleasant and cooperative with the exam.\par Appears in no distress\par Facial expression is significantly reduced speech is dysarthric sometimes difficult to understand\par No resting action or postural tremors are seen\par \par At the last visit,\par no nuchal rigidity\par DTRs are equal symmetric\par Bilateral bradykinesia\par Hand opening and closing 1 on the right 2 on the left\par Rapid alternating movements 2 on the right 3 on the left\par Finger taps 2 on the right 3 on the left\par Tone normal in the right, 1 on the left\par Extraocular movements are intact, prisms on glasses\par Difficulty getting up from the chair, was able to get up after 2-3 attempts.  Posture is very stooped steps are small en bloc turn.  He is able to ambulate independently but slowly\par applause sign positive

## 2022-06-10 NOTE — DISCUSSION/SUMMARY
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  His symptoms have been present since at least 2010.  Initial symptoms were double vision cognitive slowing slowness in daily activities gait instability walking disturbance since 2016\par Fluctuation in blood pressure on starting Sinemet slight? improvement of symptoms on Sinemet.  He has only been on small doses because of the drop in blood pressure.  No tremors\par Currently on controlled release tablet twice a day\par Impression- parkinsonism atypical versus idiopathic\par As per daughter had a recent MRI of the brain in October which was normal.  \par \par Plan\par Will gradually titrate down on ropinirole from 5 mg till he is at his baseline dose of 0.25 mg.  He will do this gradually by 0.25 mg in weekly intervals.\par Continue Sinemet 1 tablet twice a day\par Monitor blood pressure, monitor daytime sleepiness\par Continue current dose of donepezil and Namenda\par Physical therapy, fall precautions\par Patient has a scheduled follow-up in August encouraged to call sooner if there are any other questions or concerns\par \par \par

## 2022-06-10 NOTE — HISTORY OF PRESENT ILLNESS
[FreeTextEntry1] : Mr. Chacko is an 87-year-old right-handed male who is accompanied by his daughter Citlali for a second opinion for parkinsonism.\par Citlali is a critical care and pulmonary physician.  Her father is a retired OB/GYN.\par They are seeking a second opinion for possible atypical parkinsonism.  History is obtained from both of them and from review of records\par \par \par His initial symptoms were changes in memory which started around 2010 where family noticed cognitive slowing difficulty with comprehension and executive functioning etc.  \par In 2012 he developed double vision mostly with upward gaze.  He was treated as presumptive myasthenia gravis on Mestinon.  He had negative serology and EMG at that time.  He was on Mestinon for about 4 years but his double vision and drooling were getting worse and hence Mestinon was stopped.\par In 2016 he started developing gait instability.  He did not have any falls but was having slowness of movement.  In 2021 he fell for the first time and had 3 falls.\par MRI of the brain was unremarkable DaTscan not done he was seen by Dr. Remington Martinez at [facility] who diagnosed him with parkinsonism and initiated him on Sinemet.  He was taking 1 tablet twice a day with some improvement mostly noticed by the patient as slight improvement in mobility however the family did not notice much improvement may be the tremor which was minimal to better begin with was less.  However his blood pressure dropped with Sinemet.  This was later changed to controlled release which she has been tolerating well.\par He also saw an ophthalmologist who examined him and said he had no extraocular movement abnormalities.\par \par Patient was having significant difficulty swallowing with the drooling which has been better since Mestinon has been stopped.  His swallowing is still slow he uses the chin tuck maneuver.  He had been working with a speech therapist.\par \par Blood pressure tends to be labile especially since levodopa was started\par Tremor was minimal to begin with mostly on the left and has been better since levodopa was started\par Gait is festinating posture is stooped forward\par Speech is slurred and hypophonic\par He had one vivid dream denies any hallucinations during daytime\par Reports slowness in all daily activities and stiffness in his neck.  Has difficulty getting up from the chair. \par \par He lives alone with his wife who also has dementia.  They have help during daytime someone who helps cook and light housekeeping.  They also have an aide at bedtime.\par \par He was initiated on donepezil and Namenda in 2021.  He has been tolerating those well.  He is on Namenda 5 mg twice a day and donepezil 10 mg at bedtime\par He ambulates with a cane although a walker has been suggested.  The 3 times he had a fall he was not using his cane either.  Currently undergoing physical therapy twice a week\par \par Had tried mirtazapine at bedtime to help with appetite and to help him sleep better at night however after 2-week trial patient decided it was not helpful and stopped using it.\par He has history of anxiety in the past and continues to have irritability\par No hallucinations\par Has been incontinent of urine for more than 10 years he also had TURP done in 2010\par \par Has history of RLS is on ropinirole 0.25 mg at bedtime\par \par Review of systems-a complete review of systems is performed and is negative except as listed in HPI\par \par Interval history April 11, 2022.  Patient is accompanied by a different daughter Marilyn, OB/GYN specialist.  She states that they tried increasing Sinemet to 1-1/2 tablets twice a day however it dropped his blood pressure.  Currently patient is on Sinemet CR 25/100, 1 tablet in the morning and 1 in the afternoon.  He states that he does not notice any benefit with the medication and 2 hours after the pill he feels groggy.  No falls since May 2021.  He has frequent nocturia at times which interferes with his sleeping.  RLS is well controlled with ropinirole 0.25 mg at bedtime.  He is also on donepezil and Namenda\par \par Interval history [place] 10, 2022.  This is a telehealth visit.  Patient is accompanied by his healthcare manager Ana and daughter Marilyn.  Patient had a fall in April around the 26th and was taken to the hospital.  Patient had been to a wedding the day before and was very tired he lost his balance and fell.  He fractured his right femur and had 2 rods placed.  Thereafter he was in rehab.  On admission to the hospital his ropinirole was increased from 0.25 mg at bedtime to 5 mg.  Also he was given Sinemet 25/101 tablet twice a day.  Patient just returned home yesterday.  He has been reporting feeling very sleepy during this entire hospitalization and rehab time.  Denies any hallucinations.  Memory is unchanged.  He continues to be on donepezil 10 mg daily and Namenda 5 mg twice daily

## 2022-06-14 ENCOUNTER — NON-APPOINTMENT (OUTPATIENT)
Age: 87
End: 2022-06-14

## 2022-06-22 ENCOUNTER — NON-APPOINTMENT (OUTPATIENT)
Age: 87
End: 2022-06-22

## 2022-07-05 PROBLEM — Z00.00 ENCOUNTER FOR PREVENTIVE HEALTH EXAMINATION: Status: ACTIVE | Noted: 2022-07-05

## 2022-07-08 ENCOUNTER — APPOINTMENT (OUTPATIENT)
Dept: GERIATRICS | Facility: CLINIC | Age: 87
End: 2022-07-08

## 2022-07-08 VITALS
DIASTOLIC BLOOD PRESSURE: 54 MMHG | OXYGEN SATURATION: 97 % | RESPIRATION RATE: 15 BRPM | HEART RATE: 63 BPM | SYSTOLIC BLOOD PRESSURE: 112 MMHG | BODY MASS INDEX: 21.63 KG/M2 | WEIGHT: 126 LBS | TEMPERATURE: 97.3 F

## 2022-07-08 PROCEDURE — 99214 OFFICE O/P EST MOD 30 MIN: CPT

## 2022-07-08 NOTE — HISTORY OF PRESENT ILLNESS
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 went to [place]\par now home\par spends a lot of time in the wheelchair but needs to start home PT - has walker\par concern that is current wheelchair (transport) is not adequate for his daily needs\par  no skin breakdown\par OP: was taking fosamax, held, pending f/u labwork, he denies GERD, or stomach pain\par \par now has aides 24/7 due to impaired mobility and ADL's\par \par He had increased fatigue and sleepiness with higher dose of namenda which was reduced\par \par takes levo 50mcg\par \par bph: \par Dr. Segundo Bruno  urologist\par hx of prostate ablation\par \par \par lives with wife \par \par May 2021: fall required ER visit for facial bruising, no fxr \par \par \par atypical Parkinsons syndrome:\par now following with Dr. Ko\par previously had seen Dr. Sherrill Collins at [facility] has had memory testing dtr reports moderate dementia\par started sinemet in May 2021\par constipation: colace daily controlled\par sleep study in May 2021: negative for sleep apnea\par \par hx of prediabetes that is diet controlled\par \par notes neuropathy: numbness of b/l legs? related to RLS\par \par ACP:\par planning for seeing  tomorrow for living will, power of  and hcp\par discussed completing today, but they want to complete tomorrow with \par \par 1 children\par son: Segundo Chacko\par dtr: Citlali Chaney  (md pulm crit)\par dtr: Marilyn Chacko \par \par \par neurology Dr. sherrill Martinez [phone]\par Neuro ophthal D.r Oscar-Leeanne [phone]\par [facility] at [facility]: Dr. Pruett [phone]\par Endocrinology: dr. oren Lagos [phone] [[id number]] : implant [Wheelchair] : wheelchair

## 2022-07-08 NOTE — PHYSICAL EXAM
[Normal] : alert, in no acute distress [Sclera] : the sclera and conjunctiva were normal [EOMI] : extraocular movements were intact [Normal Outer Ear/Nose] : the ears and nose were normal in appearance [Normal Appearance] : the appearance of the neck was normal [Supple] : the neck was supple [No Respiratory Distress] : no respiratory distress [No Acc Muscle Use] : no accessory muscle use [Respiration, Rhythm And Depth] : normal respiratory rhythm and effort [Auscultation Breath Sounds / Voice Sounds] : lungs were clear to auscultation bilaterally [Normal S1, S2] : normal S1 and S2 [Heart Rate And Rhythm] : heart rate was normal and rhythm regular [Edema] : edema was not present [Abdomen Tenderness] : non-tender [Abdomen Soft] : soft [No Clubbing, Cyanosis] : no clubbing or cyanosis of the fingernails [Involuntary Movements] : no involuntary movements were seen [Normal Color / Pigmentation] : normal skin color and pigmentation [No Focal Deficits] : no focal deficits [Normal Affect] : the affect was normal [de-identified] : wheelchair

## 2022-07-08 NOTE — ASSESSMENT
[FreeTextEntry1] : right hip fracture s/p  right hip closed reduction percutaneous fixation on 4/27/2022\par impaired gait and ADL's\par -start home PT/OT\par -tylenol TID for pain PRN\par OP- f/u labwork if stable will restart fosamax\par \par \par atypical Parkinsons disease:with impaired mobility and ADL's: dementia\par c/w HHA 24/7\par wheelchair, and walker with assistance\par c/w sinemet \par  continue Memantine reduce if symptoms of fatigue persist to 5 mg daily \par  - f/up with neurology\par \par hypothyroidism:\par c/w levo, f/u tsh\par \par f/u labwork\par f/u 4 months\par \par \par \par \par \par

## 2022-07-12 ENCOUNTER — LABORATORY RESULT (OUTPATIENT)
Age: 87
End: 2022-07-12

## 2022-07-28 ENCOUNTER — TRANSCRIPTION ENCOUNTER (OUTPATIENT)
Age: 87
End: 2022-07-28

## 2022-09-01 ENCOUNTER — APPOINTMENT (OUTPATIENT)
Dept: ORTHOPEDIC SURGERY | Facility: CLINIC | Age: 87
End: 2022-09-01
Payer: MEDICARE

## 2022-09-01 PROCEDURE — 99213 OFFICE O/P EST LOW 20 MIN: CPT

## 2022-09-01 PROCEDURE — 73502 X-RAY EXAM HIP UNI 2-3 VIEWS: CPT

## 2022-09-01 PROCEDURE — 72190 X-RAY EXAM OF PELVIS: CPT | Mod: 59,RT

## 2022-09-02 ENCOUNTER — APPOINTMENT (OUTPATIENT)
Dept: GERIATRICS | Facility: CLINIC | Age: 87
End: 2022-09-02

## 2022-09-23 ENCOUNTER — APPOINTMENT (OUTPATIENT)
Dept: NEUROLOGY | Facility: CLINIC | Age: 87
End: 2022-09-23

## 2022-11-04 ENCOUNTER — APPOINTMENT (OUTPATIENT)
Dept: GERIATRICS | Facility: CLINIC | Age: 87
End: 2022-11-04

## 2022-11-04 VITALS
SYSTOLIC BLOOD PRESSURE: 94 MMHG | OXYGEN SATURATION: 99 % | RESPIRATION RATE: 16 BRPM | DIASTOLIC BLOOD PRESSURE: 68 MMHG | WEIGHT: 121 LBS | BODY MASS INDEX: 20.77 KG/M2 | TEMPERATURE: 97.2 F | HEART RATE: 80 BPM

## 2022-11-04 DIAGNOSIS — I95.1 ORTHOSTATIC HYPOTENSION: ICD-10-CM

## 2022-11-04 DIAGNOSIS — G25.81 RESTLESS LEGS SYNDROME: ICD-10-CM

## 2022-11-04 DIAGNOSIS — M25.551 PAIN IN RIGHT HIP: ICD-10-CM

## 2022-11-04 DIAGNOSIS — F32.A DEPRESSION, UNSPECIFIED: ICD-10-CM

## 2022-11-04 PROCEDURE — 99214 OFFICE O/P EST MOD 30 MIN: CPT

## 2022-11-04 NOTE — PHYSICAL EXAM
[Normal] : alert, in no acute distress [Sclera] : the sclera and conjunctiva were normal [EOMI] : extraocular movements were intact [Normal Outer Ear/Nose] : the ears and nose were normal in appearance [Normal Appearance] : the appearance of the neck was normal [Supple] : the neck was supple [No Respiratory Distress] : no respiratory distress [No Acc Muscle Use] : no accessory muscle use [Respiration, Rhythm And Depth] : normal respiratory rhythm and effort [Auscultation Breath Sounds / Voice Sounds] : lungs were clear to auscultation bilaterally [Normal S1, S2] : normal S1 and S2 [Heart Rate And Rhythm] : heart rate was normal and rhythm regular [Edema] : edema was not present [Abdomen Tenderness] : non-tender [Abdomen Soft] : soft [No Clubbing, Cyanosis] : no clubbing or cyanosis of the fingernails [Involuntary Movements] : no involuntary movements were seen [Normal Color / Pigmentation] : normal skin color and pigmentation [No Focal Deficits] : no focal deficits [Normal Affect] : the affect was normal [de-identified] : wheelchair

## 2022-11-04 NOTE — ASSESSMENT
[FreeTextEntry1] : right hip pain:\par hx of right hip fracture s/p  right hip closed reduction percutaneous fixation on 4/27/2022\par impaired gait and ADL's, using wheelchair\par -cw home PT/OT\par -tylenol TID for pain PRN\par -dicussed additional pain medication with tramadol PRN severe pain, counselled on s/e\par OP- f/u labwork if stable will restart fosamax\par \par depression:\par trial of mirtazepine 7.5mg qhs\par \par atypical Parkinsons disease:with impaired mobility and ADL's: dementia\par c/w HHA 24/7\par wheelchair, and walker with assistance\par c/w sinemet \par  continue Memantine reduced dose due to fatigue at higher dose\par  - f/up with neurology\par \par hypothyroidism:\par c/w levo, f/u tsh\par \par BPH: with nocturia and overactive bladder symptoms\par advised f/u with urology, possible trial of myrbetriq, but hold off at this time\par \par set up for home draw\par \par \par \par \par \par

## 2022-11-04 NOTE — HISTORY OF PRESENT ILLNESS
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 went to [place] and was doing well.  but then has had continued right hip pain, more with walking.  has been working with PT which is helping.\par using wheelchair\par  no skin breakdown\par OP: was taking fosamax, held, pending f/u labwork, he denies GERD, or stomach pain\par \par now has aides 24/7 due to impaired mobility and ADL's\par \par He had increased fatigue and sleepiness with higher dose of namenda which was reduced\par \par takes levo 50mcg\par \par bph: \par Dr. Segundo Bruno  urologist\par hx of prostate ablation\par 7x nocturia with little urinary output\par had pvr that was negative for retention\par \par \par lives with wife \par \par May 2021: fall required ER visit for facial bruising, no fxr \par \par \par atypical Parkinsons syndrome:\par now following with Dr. Ko\par previously had seen Dr. Sherrill Collins at [facility] has had memory testing dtr reports moderate dementia\par started sinemet in May 2021\par constipation: colace daily controlled\par sleep study in May 2021: negative for sleep apnea\par \par hx of prediabetes that is diet controlled\par \par notes neuropathy: numbness of b/l legs? related to RLS\par \par ACP:\par follow up at next visit\par \par 1 children\par son: Segundo Chacko\par dtr: Citlali Chaney  (md pulm crit)\par dtr: Marilyn GALEANO)\par \par \par neurology Dr. sherrill Martinez [phone]\par Neuro ophthal D.r Oscar-Leeanne [phone]\par [facility]: Dr. Pruett [phone]\par Endocrinology: dr. oren Lagos [phone]

## 2022-11-08 ENCOUNTER — LABORATORY RESULT (OUTPATIENT)
Age: 87
End: 2022-11-08

## 2022-11-17 ENCOUNTER — APPOINTMENT (OUTPATIENT)
Dept: ORTHOPEDIC SURGERY | Facility: CLINIC | Age: 87
End: 2022-11-17
Payer: MEDICARE

## 2022-11-17 DIAGNOSIS — S72.001A FRACTURE OF UNSPECIFIED PART OF NECK OF RIGHT FEMUR, INITIAL ENCOUNTER FOR CLOSED FRACTURE: ICD-10-CM

## 2022-11-17 PROCEDURE — 73502 X-RAY EXAM HIP UNI 2-3 VIEWS: CPT

## 2022-11-17 PROCEDURE — 72190 X-RAY EXAM OF PELVIS: CPT | Mod: RT

## 2022-11-17 PROCEDURE — 99213 OFFICE O/P EST LOW 20 MIN: CPT

## 2022-12-01 ENCOUNTER — LABORATORY RESULT (OUTPATIENT)
Age: 87
End: 2022-12-01

## 2022-12-07 ENCOUNTER — TRANSCRIPTION ENCOUNTER (OUTPATIENT)
Age: 87
End: 2022-12-07

## 2022-12-07 DIAGNOSIS — R35.0 FREQUENCY OF MICTURITION: ICD-10-CM

## 2022-12-09 ENCOUNTER — LABORATORY RESULT (OUTPATIENT)
Age: 87
End: 2022-12-09

## 2022-12-12 ENCOUNTER — LABORATORY RESULT (OUTPATIENT)
Age: 87
End: 2022-12-12

## 2022-12-13 ENCOUNTER — LABORATORY RESULT (OUTPATIENT)
Age: 87
End: 2022-12-13

## 2022-12-29 ENCOUNTER — TRANSCRIPTION ENCOUNTER (OUTPATIENT)
Age: 87
End: 2022-12-29

## 2022-12-29 PROBLEM — S72.001A FRACTURE OF FEMORAL NECK, RIGHT: Status: ACTIVE | Noted: 2022-05-11

## 2022-12-29 RX ORDER — FINASTERIDE 1 MG/1
TABLET ORAL
Refills: 0 | Status: DISCONTINUED | COMMUNITY
End: 2022-12-29

## 2022-12-29 RX ORDER — SILODOSIN 8 MG/1
8 CAPSULE ORAL DAILY
Refills: 0 | Status: ACTIVE | COMMUNITY
Start: 2022-12-29

## 2022-12-29 RX ORDER — ALENDRONATE SODIUM 70 MG/1
70 TABLET ORAL
Qty: 12 | Refills: 3 | Status: ACTIVE | COMMUNITY
Start: 2022-03-18 | End: 1900-01-01

## 2022-12-29 RX ORDER — MELOXICAM 15 MG/1
15 TABLET ORAL
Qty: 30 | Refills: 5 | Status: ACTIVE | COMMUNITY
Start: 2022-09-01 | End: 1900-01-01

## 2022-12-29 NOTE — DISCUSSION/SUMMARY
[de-identified] : Patient is an 88 year-old gentleman s/p CRPP Right Hip, approximately 4.5 months out.\par \par -Treatment options were discussed that ranged from medication and therapy, to surgical options. All risks and benefits were discussed.  The patient and the patient's daughter are not interested in further surgical intervention at this time.\par -Weightbearing as tolerated\par -NSAIDs and Tylenol for pain. Considered adding Gabapentin for better pain control, however after a constructive conversation with his daughter, she is concerned with sedation in conjunction with Parkinson-type neurological disorder and we will not utilize gabapentin.\par -Physical therapy: Continue outpatient physical therapy if covered by insurance.\par -Add Meloxicam for better pain control\par -Home exercises. These were demonstrated in the office today.\par -Follow-up in 4-6 weeks for re-evaluation of increased pain, with x-rays at that time\par -All the patient's questions and concerns were addressed during this visit\par

## 2022-12-29 NOTE — HISTORY OF PRESENT ILLNESS
[de-identified] : BINDU Martinez is a 88 year-old male who presents to the office today for follow up s/p right hip closed reduction percutaneous fixation on 04/27/2022. He has a history significant for Parkinson's.  Since his last visit he states he is feeling throbbing pain about the anterior and posterior thigh and into the buttock and lateral aspect of the thigh.  He is wheelchair bound, walks only 10 percent of the time with walker around the house.  He has pain with weightbearing.  He is taking Tylenol as needed.  He denies numbness or tingling in the toes or feet.  He is accompanied by his daughter during this visit.  She is a physician.

## 2022-12-29 NOTE — DISCUSSION/SUMMARY
[de-identified] : Patient is an 88 year-old gentleman s/p CRPP Right Hip, approximately 6.5 months out with continued collapse of the femoral neck and head\par -The physical exam and x-ray findings were discussed with the patient and the patient's daughter.  They showed a good understanding of the patient's condition.  The risks and benefits of operative versus nonoperative management of this issue were discussed at length.  The patient and the patient's daughter are not interested in further operative management at this point.\par -Weightbearing as tolerated\par -Physical therapy: Continue outpatient physical therapy if covered by insurance.\par -Add Meloxicam for better pain control and gabapentin 100 mg at bedtime \par -Follow-up 3 months, with x-rays of the right hip at that time \par -All the patient's questions and concerns were addressed during this visit\par

## 2022-12-29 NOTE — REVIEW OF SYSTEMS
[Negative] : Heme/Lymph [Joint Pain] : joint pain [Joint Stiffness] : joint stiffness [FreeTextEntry9] : Right Hip Pain

## 2022-12-29 NOTE — PHYSICAL EXAM
[de-identified] : The patient is sitting comfortably in the exam room. \par Right hip\par -incision well-healed, no erythema, no signs of infection\par -No tenderness to palpation over the greater trochanter\par -Painless range of motion hip except for pain at endpoints\par -Flexion: 100, Internal rotation: 10, External rotation: 30\par -Sensation is intact L1-S1\par -5/5 EHL, FHL, TA, GS, quadriceps, hamstrings\par -Foot is warm and well-perfused, palpable dorsalis pedis pulse\par  [de-identified] : Xrays of the right hip were taken in the office today, 9/1/22.  AP and lateral.  X-rays show some increased collapse of the femoral neck with increased backing out of the 3 screws.\par \par 5 views of the pelvis were taken today including AP, inlet, outlet, Judet views.  X-rays show good overall alignment of the pelvis.  No widening of the SI joints.  The pubic symphysis is well reduced.

## 2022-12-29 NOTE — PHYSICAL EXAM
[de-identified] : The patient is sitting comfortably in the exam room. \par Right hip\par -Skin is intact, no swelling, no ecchymosis\par -No tenderness to palpation over the greater trochanter\par -Painless range of motion hip except for endpoints, mostly with internal rotation 30\par -Flexion: 100, Internal rotation: 10, External rotation:\par -Sensation is intact L1-S1\par -5/5 EHL, FHL, TA, GS, quadriceps, hamstrings\par -Foot is warm and well-perfused, palpable dorsalis pedis pulse\par  [de-identified] : Xrays of the right hip were taken in the office today, 11/16/22.  AP and lateral.  X-rays show continued collapse of the femoral neck/head in relation to the shaft.  The superior posterior screw is likely outside of the femoral head at this point.\par \par X-rays of the pelvis were taken today including AP, inlet, outlet, and Judet views.  X-rays show good overall alignment of the pelvis.  No widening of the SI joints or the pubic symphysis.

## 2022-12-29 NOTE — HISTORY OF PRESENT ILLNESS
[de-identified] : Patient is a 88 year-old male who presents to the office today for re-evaluation s/p right hip closed reduction percutaneous fixation on 04/27/2022. Since his last visit he states he is feeling continued pain. His pain is often with standing. He had done well, and was fully compliant with rehab and then was discharged in late June. Family states that the pain had worsened since discharge from the rehab facility. He has also been compliant with outpatient physical therapy. No new injuries are noted. He does note that his wheelchair is extremely uncomfortable and he prefers to sit on the couch due to comfort. His care manager presents with him today. His daughter, Marilyn, who is a physician, called in during the visit. He presents today to discuss his subjective worsening in symptoms.

## 2023-01-03 ENCOUNTER — TRANSCRIPTION ENCOUNTER (OUTPATIENT)
Age: 88
End: 2023-01-03

## 2023-01-03 ENCOUNTER — NON-APPOINTMENT (OUTPATIENT)
Age: 88
End: 2023-01-03

## 2023-01-23 ENCOUNTER — RX RENEWAL (OUTPATIENT)
Age: 88
End: 2023-01-23

## 2023-02-15 RX ORDER — DONEPEZIL HYDROCHLORIDE 10 MG/1
1 TABLET, FILM COATED ORAL
Qty: 0 | Refills: 0 | DISCHARGE

## 2023-02-15 RX ORDER — CARBIDOPA AND LEVODOPA 25; 100 MG/1; MG/1
1 TABLET ORAL
Qty: 0 | Refills: 0 | DISCHARGE

## 2023-02-15 RX ORDER — DUTASTERIDE 0.5 MG/1
1 CAPSULE, LIQUID FILLED ORAL
Qty: 0 | Refills: 0 | DISCHARGE

## 2023-02-15 RX ORDER — LEVOTHYROXINE SODIUM 125 MCG
1 TABLET ORAL
Qty: 0 | Refills: 0 | DISCHARGE

## 2023-02-15 RX ORDER — MEMANTINE HYDROCHLORIDE 10 MG/1
1 TABLET ORAL
Qty: 0 | Refills: 0 | DISCHARGE

## 2023-02-15 RX ORDER — ROPINIROLE 8 MG/1
1 TABLET, FILM COATED, EXTENDED RELEASE ORAL
Qty: 0 | Refills: 0 | DISCHARGE

## 2023-06-20 ENCOUNTER — RX RENEWAL (OUTPATIENT)
Age: 88
End: 2023-06-20

## 2024-01-03 PROBLEM — F03.90 UNSPECIFIED DEMENTIA, UNSPECIFIED SEVERITY, WITHOUT BEHAVIORAL DISTURBANCE, PSYCHOTIC DISTURBANCE, MOOD DISTURBANCE, AND ANXIETY: Chronic | Status: ACTIVE | Noted: 2022-04-25

## 2024-01-03 PROBLEM — G20 PARKINSON'S DISEASE: Chronic | Status: ACTIVE | Noted: 2022-04-25

## 2024-01-17 ENCOUNTER — APPOINTMENT (OUTPATIENT)
Dept: GERIATRICS | Facility: CLINIC | Age: 89
End: 2024-01-17
Payer: MEDICARE

## 2024-01-17 VITALS
OXYGEN SATURATION: 97 % | TEMPERATURE: 98 F | HEIGHT: 64 IN | DIASTOLIC BLOOD PRESSURE: 60 MMHG | WEIGHT: 133 LBS | BODY MASS INDEX: 22.71 KG/M2 | SYSTOLIC BLOOD PRESSURE: 90 MMHG | HEART RATE: 78 BPM | RESPIRATION RATE: 14 BRPM

## 2024-01-17 DIAGNOSIS — M81.0 AGE-RELATED OSTEOPOROSIS W/OUT CURRENT PATHOLOGICAL FRACTURE: ICD-10-CM

## 2024-01-17 DIAGNOSIS — G20.A1 PARKINSON'S DISEASE WITHOUT DYSKINESIA, WITHOUT MENTION OF FLUCTUATIONS: ICD-10-CM

## 2024-01-17 DIAGNOSIS — E03.9 HYPOTHYROIDISM, UNSPECIFIED: ICD-10-CM

## 2024-01-17 DIAGNOSIS — F02.80 PARKINSON'S DISEASE WITHOUT DYSKINESIA, WITHOUT MENTION OF FLUCTUATIONS: ICD-10-CM

## 2024-01-17 PROCEDURE — 99483 ASSMT & CARE PLN PT COG IMP: CPT

## 2024-01-17 RX ORDER — ROPINIROLE 6 MG/1
TABLET, FILM COATED, EXTENDED RELEASE ORAL
Refills: 0 | Status: DISCONTINUED | COMMUNITY
End: 2024-01-17

## 2024-01-17 RX ORDER — DUTASTERIDE 0.5 MG/1
0.5 CAPSULE, LIQUID FILLED ORAL
Qty: 30 | Refills: 6 | Status: DISCONTINUED | COMMUNITY
Start: 2022-12-29 | End: 2024-01-17

## 2024-01-17 RX ORDER — DONEPEZIL HYDROCHLORIDE 10 MG/1
10 TABLET ORAL
Qty: 90 | Refills: 3 | Status: DISCONTINUED | COMMUNITY
End: 2024-01-17

## 2024-01-17 RX ORDER — GABAPENTIN 100 MG/1
100 CAPSULE ORAL
Qty: 30 | Refills: 6 | Status: ACTIVE | COMMUNITY
Start: 2022-11-17

## 2024-01-17 RX ORDER — MELOXICAM 15 MG/1
15 TABLET ORAL
Qty: 30 | Refills: 2 | Status: DISCONTINUED | COMMUNITY
Start: 2022-11-17 | End: 2024-01-17

## 2024-01-17 RX ORDER — ROPINIROLE 0.25 MG/1
0.25 TABLET, FILM COATED ORAL
Qty: 90 | Refills: 0 | Status: DISCONTINUED | COMMUNITY
Start: 2021-07-08 | End: 2024-01-17

## 2024-01-17 RX ORDER — MIRTAZAPINE 7.5 MG/1
7.5 TABLET, FILM COATED ORAL
Qty: 90 | Refills: 2 | Status: DISCONTINUED | COMMUNITY
Start: 2022-11-04 | End: 2024-01-17

## 2024-01-17 RX ORDER — DOCUSATE SODIUM 100 MG/1
100 CAPSULE ORAL
Refills: 0 | Status: ACTIVE | COMMUNITY
Start: 2024-01-17

## 2024-01-17 RX ORDER — TRAMADOL HYDROCHLORIDE 50 MG/1
50 TABLET, COATED ORAL
Qty: 30 | Refills: 0 | Status: DISCONTINUED | COMMUNITY
Start: 2022-11-04 | End: 2024-01-17

## 2024-04-05 ENCOUNTER — LABORATORY RESULT (OUTPATIENT)
Age: 89
End: 2024-04-05

## 2024-04-08 ENCOUNTER — LABORATORY RESULT (OUTPATIENT)
Age: 89
End: 2024-04-08

## 2024-05-06 RX ORDER — LEVOTHYROXINE SODIUM 0.07 MG/1
75 TABLET ORAL
Qty: 90 | Refills: 1 | Status: ACTIVE | COMMUNITY
Start: 2023-06-20 | End: 1900-01-01

## 2024-05-06 RX ORDER — MEMANTINE HYDROCHLORIDE 5 MG/1
5 TABLET, FILM COATED ORAL
Qty: 180 | Refills: 3 | Status: ACTIVE | COMMUNITY
Start: 2021-11-29 | End: 1900-01-01

## 2024-05-06 RX ORDER — CARBIDOPA AND LEVODOPA 25; 100 MG/1; MG/1
25-100 TABLET ORAL
Qty: 30 | Refills: 5 | Status: ACTIVE | COMMUNITY
Start: 2024-01-17 | End: 1900-01-01

## 2024-05-06 RX ORDER — CARBIDOPA AND LEVODOPA 25; 100 MG/1; MG/1
25-100 TABLET, EXTENDED RELEASE ORAL
Qty: 180 | Refills: 1 | Status: ACTIVE | COMMUNITY
Start: 2021-11-18 | End: 1900-01-01

## 2024-05-10 ENCOUNTER — APPOINTMENT (OUTPATIENT)
Dept: GERIATRICS | Facility: CLINIC | Age: 89
End: 2024-05-10
Payer: MEDICARE

## 2024-05-10 DIAGNOSIS — R26.81 UNSTEADINESS ON FEET: ICD-10-CM

## 2024-05-10 DIAGNOSIS — G20.C PARKINSONISM, UNSPECIFIED: ICD-10-CM

## 2024-05-10 PROCEDURE — G2211 COMPLEX E/M VISIT ADD ON: CPT

## 2024-05-10 PROCEDURE — 99214 OFFICE O/P EST MOD 30 MIN: CPT

## 2024-05-10 NOTE — HISTORY OF PRESENT ILLNESS
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  worse hypophonia some coughing with eating  impaired mobility, but denies recent falls wheelchair use  denies falling out of bed   eating okay

## 2024-05-10 NOTE — ASSESSMENT
[FreeTextEntry1] : Atypical Parkinsons' Disease Dementia in PD: FAST 4/5 requires 24/7 care wheelchair worse hypophonia- referral for speech therapy (at home if possible) c/w colace for constipation c/w sinemet at current dose c/w namenda at 5mg bid continue off mood medication and monitor PT referral as well

## 2024-06-06 ENCOUNTER — TRANSCRIPTION ENCOUNTER (OUTPATIENT)
Age: 89
End: 2024-06-06

## 2024-07-05 ENCOUNTER — NON-APPOINTMENT (OUTPATIENT)
Age: 89
End: 2024-07-05

## 2024-08-08 ENCOUNTER — APPOINTMENT (OUTPATIENT)
Dept: GERIATRICS | Facility: CLINIC | Age: 89
End: 2024-08-08

## 2024-08-21 NOTE — ED ADULT TRIAGE NOTE - CHIEF COMPLAINT QUOTE
pt coming from home.  pt had mechanical fall today at 10am.  pt c/o right hip pain.  denies blood thinners and loc Improved.

## 2024-09-30 ENCOUNTER — APPOINTMENT (OUTPATIENT)
Dept: GERIATRICS | Facility: CLINIC | Age: 89
End: 2024-09-30
Payer: MEDICARE

## 2024-09-30 DIAGNOSIS — Z23 ENCOUNTER FOR IMMUNIZATION: ICD-10-CM

## 2024-09-30 DIAGNOSIS — F02.80 PARKINSON'S DISEASE WITHOUT DYSKINESIA, WITHOUT MENTION OF FLUCTUATIONS: ICD-10-CM

## 2024-09-30 DIAGNOSIS — D64.9 ANEMIA, UNSPECIFIED: ICD-10-CM

## 2024-09-30 DIAGNOSIS — G20.C PARKINSONISM, UNSPECIFIED: ICD-10-CM

## 2024-09-30 DIAGNOSIS — F32.A DEPRESSION, UNSPECIFIED: ICD-10-CM

## 2024-09-30 DIAGNOSIS — R26.81 UNSTEADINESS ON FEET: ICD-10-CM

## 2024-09-30 DIAGNOSIS — E03.9 HYPOTHYROIDISM, UNSPECIFIED: ICD-10-CM

## 2024-09-30 DIAGNOSIS — G20.A1 PARKINSON'S DISEASE WITHOUT DYSKINESIA, WITHOUT MENTION OF FLUCTUATIONS: ICD-10-CM

## 2024-09-30 PROCEDURE — 99214 OFFICE O/P EST MOD 30 MIN: CPT

## 2024-09-30 NOTE — HISTORY OF PRESENT ILLNESS
[Home] : at home, [unfilled] , at the time of the visit. 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  worse depressed mood  impaired mobility, but denies recent falls wheelchair use  denies falling out of bed  denies any hip pain, hasn't been using pain medications  no falls needs support during transfers, able to bear weight and do a slight pivot.  otherwise requires wheelchair having bathroom renovated  bp 125/60  no longer taking fosamax-  developed bone pain

## 2024-09-30 NOTE — PHYSICAL EXAM
[Alert] : alert [No Acute Distress] : in no acute distress [Sclera] : the sclera and conjunctiva were normal [EOMI] : extraocular movements were intact [de-identified] : hypophonia

## 2024-10-01 ENCOUNTER — LABORATORY RESULT (OUTPATIENT)
Age: 89
End: 2024-10-01

## 2024-10-03 ENCOUNTER — LABORATORY RESULT (OUTPATIENT)
Age: 89
End: 2024-10-03

## 2024-11-22 ENCOUNTER — APPOINTMENT (OUTPATIENT)
Dept: GERIATRICS | Facility: CLINIC | Age: 89
End: 2024-11-22
Payer: MEDICARE

## 2024-11-22 DIAGNOSIS — F32.A DEPRESSION, UNSPECIFIED: ICD-10-CM

## 2024-11-22 DIAGNOSIS — G20.A1 PARKINSON'S DISEASE WITHOUT DYSKINESIA, WITHOUT MENTION OF FLUCTUATIONS: ICD-10-CM

## 2024-11-22 DIAGNOSIS — G20.C PARKINSONISM, UNSPECIFIED: ICD-10-CM

## 2024-11-22 DIAGNOSIS — F02.80 PARKINSON'S DISEASE WITHOUT DYSKINESIA, WITHOUT MENTION OF FLUCTUATIONS: ICD-10-CM

## 2024-11-22 DIAGNOSIS — E03.9 HYPOTHYROIDISM, UNSPECIFIED: ICD-10-CM

## 2024-11-22 DIAGNOSIS — R26.81 UNSTEADINESS ON FEET: ICD-10-CM

## 2024-11-22 PROCEDURE — G2211 COMPLEX E/M VISIT ADD ON: CPT

## 2024-11-22 PROCEDURE — 99214 OFFICE O/P EST MOD 30 MIN: CPT

## 2024-11-24 NOTE — DISCHARGE NOTE PROVIDER - CARE PROVIDER_API CALL
Jose A Gonzalez)  Orthopedics  [address] [place] [address]  Phone: [phone]  Fax: [phone]  Follow Up Time:    Jose A Gonzalez)  Orthopedics  [address] [place] [address]  Phone: [phone]  Fax: [phone]  Follow Up Time: 2 weeks   Home

## 2025-02-21 ENCOUNTER — APPOINTMENT (OUTPATIENT)
Age: 89
End: 2025-02-21
Payer: MEDICARE

## 2025-02-21 DIAGNOSIS — M48.02 SPINAL STENOSIS, CERVICAL REGION: ICD-10-CM

## 2025-02-21 DIAGNOSIS — G20.A1 PARKINSON'S DISEASE WITHOUT DYSKINESIA, WITHOUT MENTION OF FLUCTUATIONS: ICD-10-CM

## 2025-02-21 DIAGNOSIS — G23.1: ICD-10-CM

## 2025-02-21 DIAGNOSIS — F02.80 PARKINSON'S DISEASE WITHOUT DYSKINESIA, WITHOUT MENTION OF FLUCTUATIONS: ICD-10-CM

## 2025-02-21 DIAGNOSIS — R26.81 UNSTEADINESS ON FEET: ICD-10-CM

## 2025-02-21 DIAGNOSIS — F03.90 UNSPECIFIED DEMENTIA W/OUT BEHAVIORAL DISTURBANCE: ICD-10-CM

## 2025-02-21 DIAGNOSIS — T84.090S: ICD-10-CM

## 2025-02-21 DIAGNOSIS — E03.9 HYPOTHYROIDISM, UNSPECIFIED: ICD-10-CM

## 2025-02-21 PROCEDURE — 99214 OFFICE O/P EST MOD 30 MIN: CPT | Mod: 2W

## 2025-02-21 PROCEDURE — G2211 COMPLEX E/M VISIT ADD ON: CPT | Mod: 2W

## 2025-02-25 ENCOUNTER — TRANSCRIPTION ENCOUNTER (OUTPATIENT)
Age: 89
End: 2025-02-25

## 2025-03-27 ENCOUNTER — NON-APPOINTMENT (OUTPATIENT)
Age: 89
End: 2025-03-27

## (undated) DEVICE — DRAPE U POLY BLUE 60"X60"

## (undated) DEVICE — ELCTR BOVIE TIP BLADE INSULATED 2.75" EDGE

## (undated) DEVICE — DRSG COBAN 6"

## (undated) DEVICE — DRSG DERMABOND 0.7ML

## (undated) DEVICE — DRSG AQUACEL 3.5 X 4"

## (undated) DEVICE — ELCTR GROUNDING PAD ADULT COVIDIEN

## (undated) DEVICE — SUT VICRYL 2-0 27" FS-1 UNDYED

## (undated) DEVICE — DRILL BIT STRYKER ORTHO TRAUMA 4.9MM

## (undated) DEVICE — HOOD T5 PEELAWAY

## (undated) DEVICE — GLV 8 PROTEXIS (CREAM) MICRO

## (undated) DEVICE — CANISTER DISPOSABLE THIN WALL 3000CC

## (undated) DEVICE — DRSG AQUACEL 3.5 X 10"

## (undated) DEVICE — PACK TOTAL JOINT

## (undated) DEVICE — DRSG STOCKINETTE IMPERVIOUS XL

## (undated) DEVICE — SOL IRR BAG NS 0.9% 3000ML

## (undated) DEVICE — DRAPE IOBAN 33" X 23"

## (undated) DEVICE — ELCTR BOVIE BLADE 3/4" EXTENDED LENGTH 6"

## (undated) DEVICE — LABELS BLANK W PEN

## (undated) DEVICE — PACK LIJ BASIC ORTHO

## (undated) DEVICE — DRILL BIT STRYKER ORTHO TRAUMA 3.2X300MM

## (undated) DEVICE — DRAPE HIP W POUCHES 87X115X134"

## (undated) DEVICE — DRAPE SPLIT SHEET 77" X 120"

## (undated) DEVICE — SUT MONOCRYL 3-0 27" PS-2 UNDYED

## (undated) DEVICE — SUT VICRYL 0 27" OS-6 UNDYED

## (undated) DEVICE — DRAIN PENROSE .5" X 18" LATEX

## (undated) DEVICE — POSITIONER FOAM PAD FOR LATERAL HIP

## (undated) DEVICE — DRSG AQUACEL 3.5 X 6"

## (undated) DEVICE — ANESTHESIA CIRCUIT ADULT HMEF

## (undated) DEVICE — SUCTION YANKAUER NO CONTROL VENT

## (undated) DEVICE — HANDPIECE IRRIGATION W/ BATTERY PACKAND HIGH FLOW TIP

## (undated) DEVICE — POSITIONER CLIP ON PAD FOR UNIVERSAL LATERAL